# Patient Record
Sex: MALE | Race: WHITE | NOT HISPANIC OR LATINO | Employment: OTHER | URBAN - METROPOLITAN AREA
[De-identification: names, ages, dates, MRNs, and addresses within clinical notes are randomized per-mention and may not be internally consistent; named-entity substitution may affect disease eponyms.]

---

## 2021-08-06 ENCOUNTER — HOSPITAL ENCOUNTER (INPATIENT)
Facility: CLINIC | Age: 56
LOS: 3 days | Discharge: HOME | DRG: 389 | End: 2021-08-10
Attending: EMERGENCY MEDICINE | Admitting: INTERNAL MEDICINE
Payer: MEDICARE

## 2021-08-06 ENCOUNTER — APPOINTMENT (OUTPATIENT)
Dept: CT IMAGING | Facility: CLINIC | Age: 56
DRG: 389 | End: 2021-08-06
Attending: EMERGENCY MEDICINE
Payer: MEDICARE

## 2021-08-06 DIAGNOSIS — K56.600 PARTIAL SMALL BOWEL OBSTRUCTION (H): ICD-10-CM

## 2021-08-06 DIAGNOSIS — M05.79 RHEUMATOID ARTHRITIS INVOLVING MULTIPLE SITES WITH POSITIVE RHEUMATOID FACTOR (H): Primary | ICD-10-CM

## 2021-08-06 DIAGNOSIS — A04.72 C. DIFFICILE COLITIS: ICD-10-CM

## 2021-08-06 PROBLEM — E11.9 DM2 (DIABETES MELLITUS, TYPE 2) (H): Status: ACTIVE | Noted: 2020-09-04

## 2021-08-06 LAB
ALBUMIN SERPL-MCNC: 4.3 G/DL (ref 3.5–5)
ALBUMIN SERPL-MCNC: 4.3 G/DL (ref 3.5–5)
ALBUMIN UR-MCNC: 10 MG/DL
ALP SERPL-CCNC: 60 U/L (ref 45–120)
ALP SERPL-CCNC: 60 U/L (ref 45–120)
ALT SERPL W P-5'-P-CCNC: 32 U/L (ref 0–45)
ALT SERPL W P-5'-P-CCNC: 32 U/L (ref 0–45)
ANION GAP SERPL CALCULATED.3IONS-SCNC: 10 MMOL/L (ref 5–18)
APPEARANCE UR: CLEAR
AST SERPL W P-5'-P-CCNC: 29 U/L (ref 0–40)
AST SERPL W P-5'-P-CCNC: 29 U/L (ref 0–40)
BASOPHILS # BLD AUTO: 0 10E3/UL (ref 0–0.2)
BASOPHILS NFR BLD AUTO: 0 %
BILIRUB DIRECT SERPL-MCNC: 0.3 MG/DL
BILIRUB SERPL-MCNC: 1 MG/DL (ref 0–1)
BILIRUB SERPL-MCNC: 1 MG/DL (ref 0–1)
BILIRUB UR QL STRIP: NEGATIVE
BUN SERPL-MCNC: 11 MG/DL (ref 8–22)
CALCIUM SERPL-MCNC: 8.8 MG/DL (ref 8.5–10.5)
CHLORIDE BLD-SCNC: 106 MMOL/L (ref 98–107)
CO2 SERPL-SCNC: 21 MMOL/L (ref 22–31)
COLOR UR AUTO: YELLOW
CREAT SERPL-MCNC: 0.8 MG/DL (ref 0.7–1.3)
EOSINOPHIL # BLD AUTO: 0.3 10E3/UL (ref 0–0.7)
EOSINOPHIL NFR BLD AUTO: 3 %
ERYTHROCYTE [DISTWIDTH] IN BLOOD BY AUTOMATED COUNT: 12.8 % (ref 10–15)
GFR SERPL CREATININE-BSD FRML MDRD: >90 ML/MIN/1.73M2
GLUCOSE BLD-MCNC: 125 MG/DL (ref 70–125)
GLUCOSE UR STRIP-MCNC: NEGATIVE MG/DL
HCT VFR BLD AUTO: 43.4 % (ref 40–53)
HGB BLD-MCNC: 14.7 G/DL (ref 13.3–17.7)
HGB UR QL STRIP: NEGATIVE
IMM GRANULOCYTES # BLD: 0 10E3/UL
IMM GRANULOCYTES NFR BLD: 0 %
KETONES UR STRIP-MCNC: ABNORMAL MG/DL
LEUKOCYTE ESTERASE UR QL STRIP: NEGATIVE
LIPASE SERPL-CCNC: 18 U/L (ref 0–52)
LYMPHOCYTES # BLD AUTO: 2.8 10E3/UL (ref 0.8–5.3)
LYMPHOCYTES NFR BLD AUTO: 29 %
MCH RBC QN AUTO: 32.2 PG (ref 26.5–33)
MCHC RBC AUTO-ENTMCNC: 33.9 G/DL (ref 31.5–36.5)
MCV RBC AUTO: 95 FL (ref 78–100)
MONOCYTES # BLD AUTO: 0.8 10E3/UL (ref 0–1.3)
MONOCYTES NFR BLD AUTO: 9 %
MUCOUS THREADS #/AREA URNS LPF: PRESENT /LPF
NEUTROPHILS # BLD AUTO: 5.8 10E3/UL (ref 1.6–8.3)
NEUTROPHILS NFR BLD AUTO: 59 %
NITRATE UR QL: NEGATIVE
NRBC # BLD AUTO: 0 10E3/UL
NRBC BLD AUTO-RTO: 0 /100
PH UR STRIP: 5.5 [PH] (ref 5–7)
PLATELET # BLD AUTO: 179 10E3/UL (ref 150–450)
POTASSIUM BLD-SCNC: 4.1 MMOL/L (ref 3.5–5)
PROT SERPL-MCNC: 7.5 G/DL (ref 6–8)
PROT SERPL-MCNC: 7.5 G/DL (ref 6–8)
RBC # BLD AUTO: 4.56 10E6/UL (ref 4.4–5.9)
RBC URINE: 1 /HPF
SARS-COV-2 RNA RESP QL NAA+PROBE: NEGATIVE
SODIUM SERPL-SCNC: 137 MMOL/L (ref 136–145)
SP GR UR STRIP: 1.03 (ref 1–1.03)
SQUAMOUS EPITHELIAL: <1 /HPF
UROBILINOGEN UR STRIP-MCNC: 2 MG/DL
WBC # BLD AUTO: 9.7 10E3/UL (ref 4–11)
WBC URINE: 1 /HPF

## 2021-08-06 PROCEDURE — 81001 URINALYSIS AUTO W/SCOPE: CPT | Performed by: PHYSICIAN ASSISTANT

## 2021-08-06 PROCEDURE — 83690 ASSAY OF LIPASE: CPT | Performed by: PHYSICIAN ASSISTANT

## 2021-08-06 PROCEDURE — 36415 COLL VENOUS BLD VENIPUNCTURE: CPT | Performed by: EMERGENCY MEDICINE

## 2021-08-06 PROCEDURE — C9803 HOPD COVID-19 SPEC COLLECT: HCPCS

## 2021-08-06 PROCEDURE — 85025 COMPLETE CBC W/AUTO DIFF WBC: CPT | Performed by: EMERGENCY MEDICINE

## 2021-08-06 PROCEDURE — 82040 ASSAY OF SERUM ALBUMIN: CPT | Performed by: PHYSICIAN ASSISTANT

## 2021-08-06 PROCEDURE — 99285 EMERGENCY DEPT VISIT HI MDM: CPT | Mod: 25

## 2021-08-06 PROCEDURE — 83735 ASSAY OF MAGNESIUM: CPT | Performed by: INTERNAL MEDICINE

## 2021-08-06 PROCEDURE — 87635 SARS-COV-2 COVID-19 AMP PRB: CPT | Performed by: EMERGENCY MEDICINE

## 2021-08-06 PROCEDURE — 74176 CT ABD & PELVIS W/O CONTRAST: CPT

## 2021-08-06 PROCEDURE — 99220 PR INITIAL OBSERVATION CARE,LEVEL III: CPT | Performed by: INTERNAL MEDICINE

## 2021-08-06 PROCEDURE — G0378 HOSPITAL OBSERVATION PER HR: HCPCS

## 2021-08-06 RX ORDER — ACETAMINOPHEN 650 MG/1
650 SUPPOSITORY RECTAL EVERY 6 HOURS PRN
Status: DISCONTINUED | OUTPATIENT
Start: 2021-08-06 | End: 2021-08-10 | Stop reason: HOSPADM

## 2021-08-06 RX ORDER — ACETAMINOPHEN 325 MG/1
650 TABLET ORAL EVERY 6 HOURS PRN
Status: DISCONTINUED | OUTPATIENT
Start: 2021-08-06 | End: 2021-08-10 | Stop reason: HOSPADM

## 2021-08-06 RX ORDER — SODIUM CHLORIDE 9 MG/ML
INJECTION, SOLUTION INTRAVENOUS CONTINUOUS
Status: DISCONTINUED | OUTPATIENT
Start: 2021-08-06 | End: 2021-08-09

## 2021-08-06 RX ORDER — NALOXONE HYDROCHLORIDE 4 MG/.1ML
0.1 SPRAY NASAL PRN
COMMUNITY

## 2021-08-06 RX ORDER — BETAMETHASONE DIPROPIONATE 0.5 MG/ML
LOTION, AUGMENTED TOPICAL 2 TIMES DAILY PRN
COMMUNITY
Start: 2020-12-10

## 2021-08-06 RX ORDER — FOLIC ACID 1 MG/1
1 TABLET ORAL
COMMUNITY
Start: 2021-03-16

## 2021-08-06 RX ORDER — ROSUVASTATIN CALCIUM 40 MG/1
40 TABLET, COATED ORAL AT BEDTIME
COMMUNITY
Start: 2021-08-05

## 2021-08-06 RX ORDER — LIDOCAINE 4 G/G
1 PATCH TOPICAL DAILY PRN
COMMUNITY

## 2021-08-06 RX ORDER — POLYETHYLENE GLYCOL 3350 17 G/17G
17 POWDER, FOR SOLUTION ORAL PRN
COMMUNITY

## 2021-08-06 RX ORDER — ONDANSETRON 4 MG/1
4 TABLET, ORALLY DISINTEGRATING ORAL EVERY 6 HOURS PRN
Status: DISCONTINUED | OUTPATIENT
Start: 2021-08-06 | End: 2021-08-10 | Stop reason: HOSPADM

## 2021-08-06 RX ORDER — TRIAMCINOLONE ACETONIDE 1 MG/ML
1 LOTION TOPICAL 2 TIMES DAILY PRN
COMMUNITY
Start: 2020-12-10

## 2021-08-06 RX ORDER — FENOFIBRATE 160 MG/1
160 TABLET ORAL DAILY
COMMUNITY
Start: 2021-01-19

## 2021-08-06 RX ORDER — ONDANSETRON 2 MG/ML
4 INJECTION INTRAMUSCULAR; INTRAVENOUS EVERY 6 HOURS PRN
Status: DISCONTINUED | OUTPATIENT
Start: 2021-08-06 | End: 2021-08-10 | Stop reason: HOSPADM

## 2021-08-06 RX ORDER — CYCLOBENZAPRINE HCL 10 MG
10 TABLET ORAL
COMMUNITY
Start: 2020-12-10

## 2021-08-06 RX ORDER — GLIPIZIDE 5 MG/1
1 TABLET, FILM COATED, EXTENDED RELEASE ORAL EVERY 24 HOURS
COMMUNITY
Start: 2021-06-22

## 2021-08-06 ASSESSMENT — ACTIVITIES OF DAILY LIVING (ADL)
DRESSING/BATHING_DIFFICULTY: NO
WALKING_OR_CLIMBING_STAIRS_DIFFICULTY: NO
WEAR_GLASSES_OR_BLIND: YES
DIFFICULTY_EATING/SWALLOWING: NO
CONCENTRATING,_REMEMBERING_OR_MAKING_DECISIONS_DIFFICULTY: NO
DEPENDENT_IADLS:: INDEPENDENT
FALL_HISTORY_WITHIN_LAST_SIX_MONTHS: NO
TOILETING_ISSUES: NO
DOING_ERRANDS_INDEPENDENTLY_DIFFICULTY: NO
VISION_MANAGEMENT: GLASSES
DIFFICULTY_COMMUNICATING: NO

## 2021-08-06 ASSESSMENT — MIFFLIN-ST. JEOR: SCORE: 2026.48

## 2021-08-06 NOTE — ED TRIAGE NOTES
Pt presents to the ED with c/o worsening abdominal pain and right flank pain for the past 3 days. Denies any fevers or emesis.

## 2021-08-07 LAB
ALBUMIN SERPL-MCNC: 3.8 G/DL (ref 3.5–5)
ALP SERPL-CCNC: 59 U/L (ref 45–120)
ALT SERPL W P-5'-P-CCNC: 25 U/L (ref 0–45)
ANION GAP SERPL CALCULATED.3IONS-SCNC: 7 MMOL/L (ref 5–18)
AST SERPL W P-5'-P-CCNC: 23 U/L (ref 0–40)
BASOPHILS # BLD AUTO: 0 10E3/UL (ref 0–0.2)
BASOPHILS NFR BLD AUTO: 0 %
BILIRUB SERPL-MCNC: 1.2 MG/DL (ref 0–1)
BUN SERPL-MCNC: 10 MG/DL (ref 8–22)
CALCIUM SERPL-MCNC: 8.5 MG/DL (ref 8.5–10.5)
CHLORIDE BLD-SCNC: 106 MMOL/L (ref 98–107)
CO2 SERPL-SCNC: 25 MMOL/L (ref 22–31)
CREAT SERPL-MCNC: 0.75 MG/DL (ref 0.7–1.3)
EOSINOPHIL # BLD AUTO: 0.3 10E3/UL (ref 0–0.7)
EOSINOPHIL NFR BLD AUTO: 4 %
ERYTHROCYTE [DISTWIDTH] IN BLOOD BY AUTOMATED COUNT: 12.8 % (ref 10–15)
GFR SERPL CREATININE-BSD FRML MDRD: >90 ML/MIN/1.73M2
GLUCOSE BLD-MCNC: 120 MG/DL (ref 70–125)
GLUCOSE BLDC GLUCOMTR-MCNC: 104 MG/DL (ref 70–125)
GLUCOSE BLDC GLUCOMTR-MCNC: 115 MG/DL (ref 70–125)
GLUCOSE BLDC GLUCOMTR-MCNC: 117 MG/DL (ref 70–125)
GLUCOSE BLDC GLUCOMTR-MCNC: 93 MG/DL (ref 70–125)
HCT VFR BLD AUTO: 40.2 % (ref 40–53)
HGB BLD-MCNC: 13.8 G/DL (ref 13.3–17.7)
IMM GRANULOCYTES # BLD: 0 10E3/UL
IMM GRANULOCYTES NFR BLD: 0 %
LYMPHOCYTES # BLD AUTO: 2.3 10E3/UL (ref 0.8–5.3)
LYMPHOCYTES NFR BLD AUTO: 29 %
MAGNESIUM SERPL-MCNC: 1.8 MG/DL (ref 1.8–2.6)
MCH RBC QN AUTO: 33 PG (ref 26.5–33)
MCHC RBC AUTO-ENTMCNC: 34.3 G/DL (ref 31.5–36.5)
MCV RBC AUTO: 96 FL (ref 78–100)
MONOCYTES # BLD AUTO: 0.7 10E3/UL (ref 0–1.3)
MONOCYTES NFR BLD AUTO: 8 %
NEUTROPHILS # BLD AUTO: 4.8 10E3/UL (ref 1.6–8.3)
NEUTROPHILS NFR BLD AUTO: 59 %
NRBC # BLD AUTO: 0 10E3/UL
NRBC BLD AUTO-RTO: 0 /100
PLATELET # BLD AUTO: 193 10E3/UL (ref 150–450)
POTASSIUM BLD-SCNC: 3.8 MMOL/L (ref 3.5–5)
PROT SERPL-MCNC: 6.7 G/DL (ref 6–8)
RBC # BLD AUTO: 4.18 10E6/UL (ref 4.4–5.9)
SODIUM SERPL-SCNC: 138 MMOL/L (ref 136–145)
WBC # BLD AUTO: 8.2 10E3/UL (ref 4–11)

## 2021-08-07 PROCEDURE — 85025 COMPLETE CBC W/AUTO DIFF WBC: CPT | Performed by: INTERNAL MEDICINE

## 2021-08-07 PROCEDURE — 99207 PR CDG-MDM COMPONENT: MEETS MODERATE - DOWN CODED: CPT | Performed by: INTERNAL MEDICINE

## 2021-08-07 PROCEDURE — G0378 HOSPITAL OBSERVATION PER HR: HCPCS

## 2021-08-07 PROCEDURE — 99232 SBSQ HOSP IP/OBS MODERATE 35: CPT | Performed by: INTERNAL MEDICINE

## 2021-08-07 PROCEDURE — 36415 COLL VENOUS BLD VENIPUNCTURE: CPT | Performed by: INTERNAL MEDICINE

## 2021-08-07 PROCEDURE — 82040 ASSAY OF SERUM ALBUMIN: CPT | Performed by: INTERNAL MEDICINE

## 2021-08-07 PROCEDURE — 120N000001 HC R&B MED SURG/OB

## 2021-08-07 PROCEDURE — 250N000013 HC RX MED GY IP 250 OP 250 PS 637: Performed by: INTERNAL MEDICINE

## 2021-08-07 PROCEDURE — 258N000003 HC RX IP 258 OP 636: Performed by: INTERNAL MEDICINE

## 2021-08-07 PROCEDURE — 99222 1ST HOSP IP/OBS MODERATE 55: CPT | Performed by: PHYSICIAN ASSISTANT

## 2021-08-07 RX ORDER — ASPIRIN 81 MG/1
81 TABLET, CHEWABLE ORAL DAILY
Status: DISCONTINUED | OUTPATIENT
Start: 2021-08-07 | End: 2021-08-10 | Stop reason: HOSPADM

## 2021-08-07 RX ORDER — HYDROCODONE BITARTRATE AND ACETAMINOPHEN 5; 325 MG/1; MG/1
1-2 TABLET ORAL EVERY 4 HOURS PRN
Status: DISCONTINUED | OUTPATIENT
Start: 2021-08-07 | End: 2021-08-10 | Stop reason: HOSPADM

## 2021-08-07 RX ORDER — ATORVASTATIN CALCIUM 40 MG/1
80 TABLET, FILM COATED ORAL AT BEDTIME
Status: DISCONTINUED | OUTPATIENT
Start: 2021-08-07 | End: 2021-08-10 | Stop reason: HOSPADM

## 2021-08-07 RX ORDER — NITROGLYCERIN 0.4 MG/1
0.4 TABLET SUBLINGUAL EVERY 5 MIN PRN
Status: DISCONTINUED | OUTPATIENT
Start: 2021-08-07 | End: 2021-08-10 | Stop reason: HOSPADM

## 2021-08-07 RX ORDER — NICOTINE POLACRILEX 4 MG
15-30 LOZENGE BUCCAL
Status: DISCONTINUED | OUTPATIENT
Start: 2021-08-07 | End: 2021-08-10 | Stop reason: HOSPADM

## 2021-08-07 RX ORDER — CYCLOBENZAPRINE HCL 10 MG
10 TABLET ORAL
Status: DISCONTINUED | OUTPATIENT
Start: 2021-08-07 | End: 2021-08-10 | Stop reason: HOSPADM

## 2021-08-07 RX ORDER — CLOPIDOGREL BISULFATE 75 MG/1
75 TABLET ORAL DAILY
Status: DISCONTINUED | OUTPATIENT
Start: 2021-08-07 | End: 2021-08-10 | Stop reason: HOSPADM

## 2021-08-07 RX ORDER — FOLIC ACID 1 MG/1
1 TABLET ORAL
Status: DISCONTINUED | OUTPATIENT
Start: 2021-08-07 | End: 2021-08-10 | Stop reason: HOSPADM

## 2021-08-07 RX ORDER — MORPHINE SULFATE 30 MG/1
60 TABLET, FILM COATED, EXTENDED RELEASE ORAL EVERY 12 HOURS SCHEDULED
Status: DISCONTINUED | OUTPATIENT
Start: 2021-08-07 | End: 2021-08-10 | Stop reason: HOSPADM

## 2021-08-07 RX ORDER — FENOFIBRATE 160 MG/1
160 TABLET ORAL DAILY
Status: DISCONTINUED | OUTPATIENT
Start: 2021-08-07 | End: 2021-08-10 | Stop reason: HOSPADM

## 2021-08-07 RX ORDER — SIMETHICONE 80 MG
80 TABLET,CHEWABLE ORAL EVERY 6 HOURS PRN
Status: DISCONTINUED | OUTPATIENT
Start: 2021-08-07 | End: 2021-08-10 | Stop reason: HOSPADM

## 2021-08-07 RX ORDER — DEXTROSE MONOHYDRATE 25 G/50ML
25-50 INJECTION, SOLUTION INTRAVENOUS
Status: DISCONTINUED | OUTPATIENT
Start: 2021-08-07 | End: 2021-08-10 | Stop reason: HOSPADM

## 2021-08-07 RX ORDER — PANTOPRAZOLE SODIUM 20 MG/1
40 TABLET, DELAYED RELEASE ORAL DAILY
Status: DISCONTINUED | OUTPATIENT
Start: 2021-08-07 | End: 2021-08-10 | Stop reason: HOSPADM

## 2021-08-07 RX ORDER — ROSUVASTATIN CALCIUM 10 MG/1
40 TABLET, COATED ORAL AT BEDTIME
Status: DISCONTINUED | OUTPATIENT
Start: 2021-08-07 | End: 2021-08-10 | Stop reason: HOSPADM

## 2021-08-07 RX ADMIN — SODIUM CHLORIDE: 9 INJECTION, SOLUTION INTRAVENOUS at 18:04

## 2021-08-07 RX ADMIN — SIMETHICONE 80 MG: 80 TABLET, CHEWABLE ORAL at 14:51

## 2021-08-07 RX ADMIN — ATORVASTATIN CALCIUM 80 MG: 40 TABLET, FILM COATED ORAL at 00:43

## 2021-08-07 RX ADMIN — ROSUVASTATIN CALCIUM 40 MG: 10 TABLET, FILM COATED ORAL at 00:43

## 2021-08-07 RX ADMIN — MORPHINE SULFATE 60 MG: 30 TABLET, FILM COATED, EXTENDED RELEASE ORAL at 00:43

## 2021-08-07 RX ADMIN — HYDROCODONE BITARTRATE AND ACETAMINOPHEN 2 TABLET: 5; 325 TABLET ORAL at 16:11

## 2021-08-07 RX ADMIN — HYDROCODONE BITARTRATE AND ACETAMINOPHEN 1 TABLET: 5; 325 TABLET ORAL at 04:11

## 2021-08-07 RX ADMIN — ATORVASTATIN CALCIUM 80 MG: 40 TABLET, FILM COATED ORAL at 20:56

## 2021-08-07 RX ADMIN — HYDROCODONE BITARTRATE AND ACETAMINOPHEN 2 TABLET: 5; 325 TABLET ORAL at 20:56

## 2021-08-07 RX ADMIN — FENOFIBRATE 160 MG: 160 TABLET ORAL at 08:07

## 2021-08-07 RX ADMIN — PANTOPRAZOLE SODIUM 40 MG: 20 TABLET, DELAYED RELEASE ORAL at 08:07

## 2021-08-07 RX ADMIN — Medication 12.5 MG: at 00:43

## 2021-08-07 RX ADMIN — ASPIRIN 81 MG CHEWABLE TABLET 81 MG: 81 TABLET CHEWABLE at 08:07

## 2021-08-07 RX ADMIN — HYDROCODONE BITARTRATE AND ACETAMINOPHEN 1 TABLET: 5; 325 TABLET ORAL at 08:07

## 2021-08-07 RX ADMIN — MORPHINE SULFATE 60 MG: 30 TABLET, FILM COATED, EXTENDED RELEASE ORAL at 12:31

## 2021-08-07 RX ADMIN — SODIUM CHLORIDE: 9 INJECTION, SOLUTION INTRAVENOUS at 09:49

## 2021-08-07 RX ADMIN — CLOPIDOGREL BISULFATE 75 MG: 75 TABLET, FILM COATED ORAL at 08:07

## 2021-08-07 RX ADMIN — Medication 12.5 MG: at 20:57

## 2021-08-07 RX ADMIN — Medication 12.5 MG: at 08:07

## 2021-08-07 RX ADMIN — ROSUVASTATIN CALCIUM 40 MG: 10 TABLET, FILM COATED ORAL at 20:56

## 2021-08-07 RX ADMIN — SODIUM CHLORIDE: 9 INJECTION, SOLUTION INTRAVENOUS at 01:37

## 2021-08-07 NOTE — CONSULTS
Care Management Initial Consult    General Information  Assessment completed with: Patient,    Type of CM/SW Visit: Initial Assessment    Primary Care Provider verified and updated as needed:     Readmission within the last 30 days:        Reason for Consult: discharge planning  Advance Care Planning:            Communication Assessment  Patient's communication style: spoken language (English or Bilingual)    Hearing Difficulty or Deaf: no   Wear Glasses or Blind: no    Cognitive  Cognitive/Neuro/Behavioral: WDL                      Living Environment:   People in home: child(angi), adult, grandchild(angi)     Current living Arrangements: house      Able to return to prior arrangements: yes       Family/Social Support:  Care provided by: self  Provides care for: no one  Marital Status:   Children          Description of Support System: Supportive         Current Resources:   Patient receiving home care services: No     Community Resources: None  Equipment currently used at home: none  Supplies currently used at home: None    Employment/Financial:  Employment Status: retired        Financial Concerns:             Lifestyle & Psychosocial Needs:  Social Determinants of Health     Tobacco Use: Unknown     Smoking Tobacco Use: Never Smoker     Smokeless Tobacco Use: Unknown   Alcohol Use:      Frequency of Alcohol Consumption:      Average Number of Drinks:      Frequency of Binge Drinking:    Financial Resource Strain:      Difficulty of Paying Living Expenses:    Food Insecurity:      Worried About Running Out of Food in the Last Year:      Ran Out of Food in the Last Year:    Transportation Needs:      Lack of Transportation (Medical):      Lack of Transportation (Non-Medical):    Physical Activity:      Days of Exercise per Week:      Minutes of Exercise per Session:    Stress:      Feeling of Stress :    Social Connections:      Frequency of Communication with Friends and Family:      Frequency of Social  Gatherings with Friends and Family:      Attends Muslim Services:      Active Member of Clubs or Organizations:      Attends Club or Organization Meetings:      Marital Status:    Intimate Partner Violence:      Fear of Current or Ex-Partner:      Emotionally Abused:      Physically Abused:      Sexually Abused:    Depression:      PHQ-2 Score:    Housing Stability:      Unable to Pay for Housing in the Last Year:      Number of Places Lived in the Last Year:      Unstable Housing in the Last Year:        Functional Status:  Prior to admission patient needed assistance:   Dependent ADLs:: Independent  Dependent IADLs:: Independent  Assesssment of Functional Status: At functional baseline    Mental Health Status:          Chemical Dependency Status:                Values/Beliefs:  Spiritual, Cultural Beliefs, Muslim Practices, Values that affect care:                 Additional Information:  AIDET completed. Pt lives in a private residence, his dtr and grandchildren live with him. He is independent with all ADL's, has no services and no DME used. Anticipate pt will DC back home without needs. Family will transport upon DC. Informed that CM will follow progression of care.   LUCRECIA Le      Abbreviation Code:  [facility]), Home care (HC), Patient (Pt), Transitional Care Unit (TCU), Skilled Nursing Facility (SNF), Assisted Living (AL), Independent Living (IL), Physical Therapy (PT), Occupational Therapy (OT)        Marialuisa Sandhu RN

## 2021-08-07 NOTE — PLAN OF CARE
Problem: Adult Inpatient Plan of Care  Goal: Patient-Specific Goal (Individualized)  Outcome: Improving     Problem: Nausea and Vomiting (Intestinal Obstruction)  Goal: Nausea and Vomiting Relief  Outcome: Improving     Problem: Pain (Intestinal Obstruction)  Goal: Acceptable Pain Control  Outcome: Improving   A/O, VSS, LS clear. Pt is voiding. Need BM, pt is on precautions for c-diff. IVF @ 125 ml/hr. Chronic pain throughout his body. IND with ambulation.  Given PRN MS contin for pain. AB is distended and rounded. NPO, can have ice chips.

## 2021-08-07 NOTE — PROGRESS NOTES
3:30 PM    AUGUSTINA met with Pt to complete MOON form.  Pt declined to sign form and  expresses that he did not know he was under observation and he would have left if he knew that.  Pt requested to see the MD to discuss this.  [facility] paged MD and requested that Pt be seen.    ENRIKE Amato

## 2021-08-07 NOTE — PLAN OF CARE
Problem: Adult Inpatient Plan of Care  Goal: Patient-Specific Goal (Individualized)  Outcome: Improving     Problem: Nausea and Vomiting (Intestinal Obstruction)  Goal: Nausea and Vomiting Relief  Outcome: Improving     Problem: Pain (Intestinal Obstruction)  Goal: Acceptable Pain Control  Outcome: Improving     A/O, VSS, LS clear. Pt is voiding and ambulating. NPO. Will have general surgery and GI consult today. Chronic pain, given PRN Mathews for abdomen 0411. Does help relieve pain. Pt denied passing gas. Abdomen is distended and rounded. Last BM was yesterday 1600. Stool sample still needed. Is on enteric precautions.

## 2021-08-07 NOTE — PLAN OF CARE
"Patient receiving MS Contin and Norco for abdominal pain. No BM during the day but having abdominal fullness and distention. PRN Simethicone administered. GI and surgery consult completed. Continues to be NPO with ice chips and meds. VSS.     PRIMARY DIAGNOSIS: \"GENERIC\" NURSING  OUTPATIENT/OBSERVATION GOALS TO BE MET BEFORE DISCHARGE:  ADLs back to baseline: Yes    Activity and level of assistance: Ambulating independently.    Pain status: No improvement noted. Consider adjustment in pain regimen.    Return to near baseline physical activity: Yes     Discharge Planner Nurse   Safe discharge environment identified: Yes  Barriers to discharge: Yes       Entered by: Consuelo Isabel 08/07/2021 3:01 PM     Please review provider order for any additional goals.   Nurse to notify provider when observation goals have been met and patient is ready for discharge.  "

## 2021-08-07 NOTE — CONSULTS
GASTROENTEROLOGY CONSULTATION      Shannan Kothari  [address] [place] [address]  55 year old male     Admission Date/Time: 8/6/2021  Primary Care Provider: Kristy Agustin     We were asked to see the patient in consultation by Dr. Sierra for evaluation of abdominal pain.     CC: acute on chronic abdominal pain      HPI:  Shannan Kothari is a 55 year old male with past medical history significant for chronic back pain on chronic opioid therapy, type 2 DM, CAD s/p cardiac stent on Plavix, psoriatic arthritis on methotrexate, C diff in 9/2020 admitted with acute on chronic abdominal pain, diarrhea, nausea with CT concerning for partial small bowel obstruction vs ileus.     Patient has a history dating back to around 2015 of postprandial crampy upper abdominal pain, diaphoresis, bloating, diarrhea. Symptoms have been intermittent. He has undergone evaluation through our office including EGD, colonoscopy and multiple CT scans through ER visits that have been unremarkable. Most recent colonoscopy in 2018- normal. Most recent EGD 2016. He eventually had cholecystectomy due to the pain without relief. A 5HIAA urine was negative in 2018. He has been tried on dicyclomine, digestive enzymes, antacids, and metronidazole. He presented to [facility] in September 2020 at which time C diff PCR and GDH EIA were positive but toxin A/B were negative. Due to the severity of his diarrhea contact with wife who had C diff, he was treated with vancomycin. He reports actually doing a taper of vancomycin over 3 months. His pain and diarrhea seemed to get better.     Over the past couple months, ~ 1 a month he will get recurrent symptoms and he will go on clear liquid diet for 2-3 days and symptoms will improve. It usually involves nausea and severe liquid diarrhea.     A couple days ago, he noticed more pain, slightly different than typical localized more to the right abdomen radiating to his RLQ. He had associated watery  diarrhea and nausea. He takes Zofran at home for this and states it helped so he did not vomit. He actually thought he might have either recurrent C diff or a kidney stone prompting him to present to the hospital. No fevers, chills, melena, weight loss. No recent antibiotics.     Of note, patient take MS Contin 60mg twice daily and Norco 325 mg approximately 3-4 times daily due to chronic back pain. He is followed at a pain clinic. He has been requiring more narcotics over the past couple years to manage his pain. He is on Plavix and baby ASA. No NSAIDs. He reports HGB A1c recently was in the 5 range. Only intra-abdominal surgery is cholecystectomy.     Workup here showed normal CBC, CMP but CT abd/pelvis without IV contrast showed possible partial small bowel obstruction with a few distended small bowel loops and air fluid levels with distal small bowel decompression vs paralytic ileus. Stool studies ordered but he has not had a BM since 4pm yesterday.       PAST MEDICAL HISTORY:  Patient Active Problem List    Diagnosis Date Noted     Partial small bowel obstruction (H) 08/06/2021     Priority: Medium     DM2 (diabetes mellitus, type 2) (H) 09/04/2020     Priority: Medium     Rheumatoid arthritis (H) 01/15/2010     Priority: Medium     Coronary atherosclerosis due to lipid rich plaque 03/28/2008     Priority: Medium          ROS: A comprehensive ten point review of systems was negative aside from those in mentioned in the HPI.       MEDICATIONS:   Prior to Admission medications    Medication Sig Start Date End Date Taking? Authorizing Provider   aspirin 81 mg chewable tablet [ASPIRIN 81 MG CHEWABLE TABLET] Chew 81 mg daily. 12/2/16  Yes Provider, Historical   atorvastatin (LIPITOR) 80 MG tablet [ATORVASTATIN (LIPITOR) 80 MG TABLET] Take 80 mg by mouth bedtime. 12/2/16  Yes Provider, Historical   clopidogrel (PLAVIX) 75 mg tablet [CLOPIDOGREL (PLAVIX) 75 MG TABLET] Take 75 mg by mouth daily. 12/2/16  Yes Provider,  Historical   cyclobenzaprine (FLEXERIL) 10 MG tablet Take 10 mg by mouth at bedtime as needed, may repeat once for muscle spasms 12/10/20  Yes Unknown, Entered By History   fenofibrate (TRIGLIDE/LOFIBRA) 160 MG tablet Take 160 mg by mouth daily  1/19/21  Yes Unknown, Entered By History   folic acid (FOLVITE) 1 MG tablet Take 1 mg by mouth daily before breakfast 3/16/21  Yes Unknown, Entered By History   glipiZIDE (GLUCOTROL XL) 5 MG 24 hr tablet Take 1 tablet by mouth every 24 hours 6/22/21  Yes Unknown, Entered By History   HYDROcodone-acetaminophen 5-325 mg per tablet [HYDROCODONE-ACETAMINOPHEN 5-325 MG PER TABLET] Take 1-2 tablets by mouth every 4 (four) hours as needed for pain.  12/2/16  Yes Provider, Historical   Lidocaine (LIDOCARE) 4 % Patch Place 1 patch onto the skin daily as needed   Yes Unknown, Entered By History   methotrexate 2.5 MG tablet [METHOTREXATE 2.5 MG TABLET] Take 15 mg by mouth once a week. 6 tablets every Friday. 12/7/16  Yes Provider, Historical   metoprolol tartrate (LOPRESSOR) 25 MG tablet [METOPROLOL TARTRATE (LOPRESSOR) 25 MG TABLET] Take 12.5 mg by mouth 2 (two) times a day. 12/7/16  Yes Provider, Historical   morphine (MS CONTIN) 60 MG 12 hr tablet [MORPHINE (MS CONTIN) 60 MG 12 HR TABLET] Take 60 mg by mouth 2 (two) times a day. 12/2/16  Yes Provider, Historical   naloxone (NARCAN) 4 MG/0.1ML nasal spray Spray 0.1 mLs in nostril as needed spray 0.1 milliliter by intranasal route in 1 nostril may repeat dose every 2-3 minutes as needed alternating nostrils with each dose for emergency use   Yes Unknown, Entered By History   nitroglycerin (NITROSTAT) 0.4 MG SL tablet [NITROGLYCERIN (NITROSTAT) 0.4 MG SL TABLET] Place 0.4 mg under the tongue every 5 (five) minutes as needed for chest pain. 12/7/16  Yes Provider, Historical   OMEGA-3/DHA/EPA/FISH OIL (FISH OIL-OMEGA-3 FATTY ACIDS) 300-1,000 mg capsule [OMEGA-3/DHA/EPA/FISH OIL (FISH OIL-OMEGA-3 FATTY ACIDS) 300-1,000 MG CAPSULE] Take 1  "g by mouth 2 (two) times a day.  12/2/16  Yes Provider, Historical   pantoprazole (PROTONIX) 40 MG tablet Take 40 mg by mouth daily  12/7/16  Yes Provider, Historical   polyethylene glycol (MIRALAX) 17 GM/Dose powder Take 17 g by mouth as needed   Yes Unknown, Entered By History   rosuvastatin (CRESTOR) 40 MG tablet Take 40 mg by mouth At Bedtime 8/5/21  Yes Unknown, Entered By History   triamcinolone (KENALOG) 0.1 % external lotion Apply 1 Application topically 2 times daily as needed Apply  topically to affected area(s) 2 times daily prn For face 12/10/20  Yes Unknown, Entered By History   augmented betamethasone dipropionate (DIPROLENE) 0.05 % external lotion Apply topically 2 times daily as needed Apply to scalp as need 12/10/20   Unknown, Entered By History   sertraline (ZOLOFT) 50 MG tablet [SERTRALINE (ZOLOFT) 50 MG TABLET] Take 50 mg by mouth daily.  Patient not taking: Reported on 8/6/2021 12/7/16   Provider, Historical        ALLERGIES: No Known Allergies     SOCIAL HISTORY:  Social History     Tobacco Use     Smoking status: Never Smoker     Smokeless tobacco: Never Used   Substance Use Topics     Alcohol use: No     Drug use: No        FAMILY HISTORY:  No family history on file.     PHYSICAL EXAM:   /67 (BP Location: Right arm)   Pulse 63   Temp 97.5  F (36.4  C) (Oral)   Resp 18   Ht 1.854 m (6' 1\")   Wt 114.8 kg (253 lb)   SpO2 93%   BMI 33.38 kg/m       PHYSICAL EXAM:  General: alert, oriented, NAD  SKIN: no suspicious lesions, rashes, jaundice, or spider angiomas  HEAD: Normocephalic. No masses, lesions, tenderness or abnormalities  NECK: Neck supple. No adenopathy. Thyroid symmetric, normal size.  EYES: No scleral icterus  ENT: ENT exam normal, no neck nodes or sinus tenderness  RESPIRATORY: negative, Good diaphragmatic excursion. Lungs clear  CARDIOVASCULAR: negative, PMI normal. No lifts, heaves, or thrills. RRR. No murmurs, clicks gallops or rub  GASTROINTESTINAL: did not appreciate " bowel sounds, soft, moderate upper abdominal, LUQ, and right sided abdominal tenderness, no HSM, no masses/guarding/rebound  JOINT/EXTREMITIES: extremities normal- no gross deformities noted, gait normal and normal muscle tone  NEURO: Reflexes grossly normal and symmetric. Sensation grossly WNL.  PSYCH: no abnormal anxiety/depression  LYMPH: No anterior cervical, posterior cervical, or supraclavicular adenopathy     LABS:  I reviewed the patient's new clinical lab test results.   Recent Labs   Lab Test 08/07/21 0649 08/06/21 1917   WBC 8.2 9.7   HGB 13.8 14.7   MCV 96 95    179     Recent Labs   Lab Test 08/07/21  0649 08/06/21 1917    137   POTASSIUM 3.8 4.1   CHLORIDE 106 106   CO2 25 21*   BUN 10 11   ANIONGAP 7 10   MODESTO 8.5 8.8     Recent Labs   Lab Test 08/07/21 0649 08/06/21  2225 08/06/21 1917   ALBUMIN 3.8  --  4.3  4.3   BILITOTAL 1.2*  --  1.0  1.0   ALT 25  --  32  32   AST 23  --  29  29   ALKPHOS 59  --  60  60   PROTEIN  --  10 *  --    LIPASE  --   --  18        IMAGING  I personally reviewed the patient's new imaging results.    EXAM: CT ABDOMEN PELVIS W/O CONTRAST  LOCATION: [facility]  DATE/TIME: 8/6/2021 7:33 PM     INDICATION: Flank pain, kidney stone suspected  COMPARISON: None.  TECHNIQUE: CT scan of the abdomen and pelvis was performed without IV contrast. Multiplanar reformats were obtained. Dose reduction techniques were used.  CONTRAST: None.     FINDINGS:   LOWER CHEST: Coronary artery disease.      HEPATOBILIARY: Hepatic steatosis. Cholecystectomy.      PANCREAS: Normal.     SPLEEN: Normal.     ADRENAL GLANDS: Normal.     KIDNEYS/BLADDER: No renal or ureteral calculi. No hydronephrosis or hydroureter. Normal urinary bladder.      BOWEL: Normal stomach. A few loops of small bowel in the anterior abdomen are mildly distended with a maximum diameter of 3.1 m. Air-fluid levels are present. There is change in caliber in the upper pelvis just to  the right of midline (axial series 3   image 153 and coronal series 5 image 55).  More distally the small bowel is normal in caliber. Normal appendix. Normal appearance of the colon with air-fluid levels. No pneumatosis.     LYMPH NODES: Normal.     VASCULATURE: Atherosclerotic disease. No portal venous gas.     PELVIC ORGANS: Normal.     OTHER: There is a small amount of free fluid in the pelvis.     MUSCULOSKELETAL: Normal.                                                               IMPRESSION:   1.  No renal or ureteral calculi. No hydronephrosis or hydroureter.  2.  Possible partial or mild small bowel obstruction. Adhesive disease is suspected. No pneumatosis or portal venous gas. A small amount of free fluid in the pelvis may be related. Note that paralytic ileus could have a similar appearance.  3.  Hepatic steatosis.      CONSULTATION ASSESSMENT AND PLAN:    55 year old male with past medical history significant for chronic back pain on chronic opioid therapy, type 2 DM, CAD s/p cardiac stent on Plavix, psoriatic arthritis on methotrexate, C diff in 9/2020, prior cholecystectomy admitted with acute on chronic abdominal pain, diarrhea, nausea with CT concerning for partial small bowel obstruction vs ileus.     1. Abdominal pain. pSBO vs ileus. Certainly at risk for ileus with high dose chronic opioid therapy. With diarrhea and history of C diff, this is considerd but now diarrhea has resolved and no bowel inflammation on CT. His periodic episodes over the past few months of postprandial pain with improvement on clear liquids would indicate possible issues with recurrent pSBO. Has one intra-abd surgery - cholecystectomy. Electrolytes are normal. Has had extensive GI workup for abd pain since 2016 as noted in HPI. No signs of Crohn's.   --NPO.   --Minimize narcotics.   --Consider NG for decompression with ongoing pain despite no issues with vomiting.   --Await surgery recs.   --May need repeat imaging with  contrast.   --If diarrhea recurs, check enteric panel and C diff.     Discussed with Dr. Mars.     Thank you for asking us to participate in the care of this patient.    JERONIMO Chapin  [facility] ([facility])         ELKE VILLAVICENCIO Note    Patient interviewed, examined, chart reviewed. I have discussed his further management with JERONIMO Chapin; and I agree with her assessment and plan. Please see her note for details.    Briefly,  is a 55 yr old male with chronic back pain and chronic opioid therapy, type 2 DM, CAD with prior PCI on chronic plavix, psoriatic arthritis on methotrexate, prior hx of c diff in Sept 2020 and prior cholecystectomy.    Presented with acute on chronic abd pain, diarrhea and nausea; with a CT scan concerning for PSBO vs ileus.    Diarrhea has resolved, now with constipation and not passing flatus, also feels bloated.  Nausea persists, no emesis.  No GI bleeding.    Concern for underlying opioid bowel; exclude infection    Agree with recommendations per surgery team and Leilani Caro.    Rod Trent MD on 8/7/2021 at 5:38 PM

## 2021-08-07 NOTE — CONSULTS
General Surgery Consultation  Shannan Kothari MRN# [id number]   Age/Sex: 55 year old male YOB: 1965     Reason for consult: 1. Partial small bowel obstruction (H)            Requesting physician: Dr. Sierra                   Assessment and Plan:   Assessment:  Gastroenteritis, possible recurrent c diff  CT shows signs of SBO, but he has been stooling, so clinically low suspicion of this    Plan:  Reviewed exam, lab, and imaging findings with him. Since this seems more of an acute on chronic issue and has been having loose stools, doubt SBO at this time. Certainly no indication for surgery. Agree with GI consult. Waiting on sample for c. Diff. From our standpoint, would be ok to do trial of clear liquids and ADAT, but will await GI input.       Ivan Gooden PA-C  Pager - [phone]  Phone - [phone]   General Surgery           Chief Complaint:     Chief Complaint   Patient presents with     Abdominal Pain     Flank Pain        History is obtained from the patient    HPI:   Shannan Kothari is a 55 year old male who presents with worsening abdominal pain and loose stools. He states he has had this chronically for about 5 years now. Every 1-2 months he will get flares similar to this that he usually can manage at home. He did have c diff last September and this feels similar to that. He has been unable to give a sample to this point. This current episode has last about 5 days and has been loose, nonbloody stools during this. The pain is crampy in nature. He has had a previous lap niko, but otherwise, no other abdominal surgeries.           Past Medical History:   History reviewed. No pertinent past medical history.           Past Surgical History:     Past Surgical History:   Procedure Laterality Date     CORONARY STENT PLACEMENT       LAPAROSCOPIC CHOLECYSTECTOMY N/A 12/7/2016    Procedure: CHOLECYSTECTOMY LAPAROSCOPIC;  Surgeon: Jimmy Lockwood MD;  Location: [facility], [place];  Service:       REPAIR CLEFT PALATE ADULT               Social History:    reports that he has never smoked. He has never used smokeless tobacco. He reports that he does not drink alcohol and does not use drugs.           Family History:   No family history on file.           Allergies:   No Known Allergies           Medications:     Prior to Admission medications    Medication Sig Start Date End Date Taking? Authorizing Provider   aspirin 81 mg chewable tablet [ASPIRIN 81 MG CHEWABLE TABLET] Chew 81 mg daily. 12/2/16  Yes Provider, Historical   atorvastatin (LIPITOR) 80 MG tablet [ATORVASTATIN (LIPITOR) 80 MG TABLET] Take 80 mg by mouth bedtime. 12/2/16  Yes Provider, Historical   clopidogrel (PLAVIX) 75 mg tablet [CLOPIDOGREL (PLAVIX) 75 MG TABLET] Take 75 mg by mouth daily. 12/2/16  Yes Provider, Historical   cyclobenzaprine (FLEXERIL) 10 MG tablet Take 10 mg by mouth at bedtime as needed, may repeat once for muscle spasms 12/10/20  Yes Unknown, Entered By History   fenofibrate (TRIGLIDE/LOFIBRA) 160 MG tablet Take 160 mg by mouth daily  1/19/21  Yes Unknown, Entered By History   folic acid (FOLVITE) 1 MG tablet Take 1 mg by mouth daily before breakfast 3/16/21  Yes Unknown, Entered By History   glipiZIDE (GLUCOTROL XL) 5 MG 24 hr tablet Take 1 tablet by mouth every 24 hours 6/22/21  Yes Unknown, Entered By History   HYDROcodone-acetaminophen 5-325 mg per tablet [HYDROCODONE-ACETAMINOPHEN 5-325 MG PER TABLET] Take 1-2 tablets by mouth every 4 (four) hours as needed for pain.  12/2/16  Yes Provider, Historical   Lidocaine (LIDOCARE) 4 % Patch Place 1 patch onto the skin daily as needed   Yes Unknown, Entered By History   methotrexate 2.5 MG tablet [METHOTREXATE 2.5 MG TABLET] Take 15 mg by mouth once a week. 6 tablets every Friday. 12/7/16  Yes Provider, Historical   metoprolol tartrate (LOPRESSOR) 25 MG tablet [METOPROLOL TARTRATE (LOPRESSOR) 25 MG TABLET] Take 12.5 mg by mouth 2 (two) times a day. 12/7/16  Yes Provider,  Historical   morphine (MS CONTIN) 60 MG 12 hr tablet [MORPHINE (MS CONTIN) 60 MG 12 HR TABLET] Take 60 mg by mouth 2 (two) times a day. 12/2/16  Yes Provider, Historical   naloxone (NARCAN) 4 MG/0.1ML nasal spray Spray 0.1 mLs in nostril as needed spray 0.1 milliliter by intranasal route in 1 nostril may repeat dose every 2-3 minutes as needed alternating nostrils with each dose for emergency use   Yes Unknown, Entered By History   nitroglycerin (NITROSTAT) 0.4 MG SL tablet [NITROGLYCERIN (NITROSTAT) 0.4 MG SL TABLET] Place 0.4 mg under the tongue every 5 (five) minutes as needed for chest pain. 12/7/16  Yes Provider, Historical   OMEGA-3/DHA/EPA/FISH OIL (FISH OIL-OMEGA-3 FATTY ACIDS) 300-1,000 mg capsule [OMEGA-3/DHA/EPA/FISH OIL (FISH OIL-OMEGA-3 FATTY ACIDS) 300-1,000 MG CAPSULE] Take 1 g by mouth 2 (two) times a day.  12/2/16  Yes Provider, Historical   pantoprazole (PROTONIX) 40 MG tablet Take 40 mg by mouth daily  12/7/16  Yes Provider, Historical   polyethylene glycol (MIRALAX) 17 GM/Dose powder Take 17 g by mouth as needed   Yes Unknown, Entered By History   rosuvastatin (CRESTOR) 40 MG tablet Take 40 mg by mouth At Bedtime 8/5/21  Yes Unknown, Entered By History   triamcinolone (KENALOG) 0.1 % external lotion Apply 1 Application topically 2 times daily as needed Apply  topically to affected area(s) 2 times daily prn For face 12/10/20  Yes Unknown, Entered By History   augmented betamethasone dipropionate (DIPROLENE) 0.05 % external lotion Apply topically 2 times daily as needed Apply to scalp as need 12/10/20   Unknown, Entered By History   sertraline (ZOLOFT) 50 MG tablet [SERTRALINE (ZOLOFT) 50 MG TABLET] Take 50 mg by mouth daily.  Patient not taking: Reported on 8/6/2021 12/7/16   Provider, Historical              Review of Systems:   The Review of Systems is negative other than noted in the HPI            Physical Exam:     Patient Vitals for the past 24 hrs:   BP Temp Temp src Pulse Resp SpO2 Height  "Weight   08/07/21 0913 113/67 97.5  F (36.4  C) Oral 63 18 93 % -- --   08/07/21 0411 118/70 98.3  F (36.8  C) Oral 64 18 98 % -- --   08/07/21 0041 (!) 142/75 98.2  F (36.8  C) Oral 69 18 98 % -- --   08/06/21 2240 135/77 98.5  F (36.9  C) Oral 83 18 98 % 1.854 m (6' 1\") 114.8 kg (253 lb)   08/06/21 1539 (!) 152/85 98.5  F (36.9  C) Oral 84 18 97 % -- 110.7 kg (244 lb)          Intake/Output Summary (Last 24 hours) at 8/7/2021 1033  Last data filed at 8/7/2021 1030  Gross per 24 hour   Intake 922 ml   Output 300 ml   Net 622 ml      Constitutional:   awake, alert, cooperative, no apparent distress, and appears stated age       Eyes:   PERRL, conjunctiva/corneas clear, EOM's intact; no scleral edema or icterus noted        ENT:   Normocephalic, without obvious abnormality, atraumatic, Lips, mucosa, and tongue normal        Hematologic / Lymphatic:   No lymphadenopathy       Lungs:   Normal respiratory effort, no accessory muscle use       Cardiovascular:   Regular rate and rhythm       Abdomen:   Soft, obese, ttp throughout, more so on the right, no rebound or guarding or peritoneal signs       Musculoskeletal:   No obvious swelling, bruising or deformity       Skin:   Skin color and texture normal for patient, no rashes or lesions              Data:        Admission on 08/06/2021   Component Date Value     Lipase 08/06/2021 18      Sodium 08/06/2021 137      Potassium 08/06/2021 4.1      Chloride 08/06/2021 106      Carbon Dioxide (CO2) 08/06/2021 21*     Anion Gap 08/06/2021 10      Urea Nitrogen 08/06/2021 11      Creatinine 08/06/2021 0.80      Calcium 08/06/2021 8.8      Glucose 08/06/2021 125      Alkaline Phosphatase 08/06/2021 60      AST 08/06/2021 29      ALT 08/06/2021 32      Protein Total 08/06/2021 7.5      Albumin 08/06/2021 4.3      Bilirubin Total 08/06/2021 1.0      GFR Estimate 08/06/2021 >90      Color Urine 08/06/2021 Yellow      Appearance Urine 08/06/2021 Clear      Glucose Urine 08/06/2021 " Negative      Bilirubin Urine 08/06/2021 Negative      Ketones Urine 08/06/2021 Trace*     Specific Gravity Urine 08/06/2021 1.030      Blood Urine 08/06/2021 Negative      pH Urine 08/06/2021 5.5      Protein Albumin Urine 08/06/2021 10 *     Urobilinogen Urine 08/06/2021 2.0*     Nitrite Urine 08/06/2021 Negative      Leukocyte Esterase Urine 08/06/2021 Negative      Mucus Urine 08/06/2021 Present*     RBC Urine 08/06/2021 1      WBC Urine 08/06/2021 1      Squamous Epithelials Uri* 08/06/2021 <1      Bilirubin Total 08/06/2021 1.0      Bilirubin Direct 08/06/2021 0.3      Protein Total 08/06/2021 7.5      Albumin 08/06/2021 4.3      Alkaline Phosphatase 08/06/2021 60      AST 08/06/2021 29      ALT 08/06/2021 32      WBC Count 08/06/2021 9.7      RBC Count 08/06/2021 4.56      Hemoglobin 08/06/2021 14.7      Hematocrit 08/06/2021 43.4      MCV 08/06/2021 95      MCH 08/06/2021 32.2      MCHC 08/06/2021 33.9      RDW 08/06/2021 12.8      Platelet Count 08/06/2021 179      % Neutrophils 08/06/2021 59      % Lymphocytes 08/06/2021 29      % Monocytes 08/06/2021 9      % Eosinophils 08/06/2021 3      % Basophils 08/06/2021 0      % Immature Granulocytes 08/06/2021 0      NRBCs per 100 WBC 08/06/2021 0      Absolute Neutrophils 08/06/2021 5.8      Absolute Lymphocytes 08/06/2021 2.8      Absolute Monocytes 08/06/2021 0.8      Absolute Eosinophils 08/06/2021 0.3      Absolute Basophils 08/06/2021 0.0      Absolute Immature Granul* 08/06/2021 0.0      Absolute NRBCs 08/06/2021 0.0      SARS CoV2 PCR 08/06/2021 Negative      Sodium 08/07/2021 138      Potassium 08/07/2021 3.8      Chloride 08/07/2021 106      Carbon Dioxide (CO2) 08/07/2021 25      Anion Gap 08/07/2021 7      Urea Nitrogen 08/07/2021 10      Creatinine 08/07/2021 0.75      Calcium 08/07/2021 8.5      Glucose 08/07/2021 120      Alkaline Phosphatase 08/07/2021 59      AST 08/07/2021 23      ALT 08/07/2021 25      Protein Total 08/07/2021 6.7      Albumin  08/07/2021 3.8      Bilirubin Total 08/07/2021 1.2*     GFR Estimate 08/07/2021 >90      Magnesium 08/06/2021 1.8      GLUCOSE BY METER POCT 08/07/2021 117      WBC Count 08/07/2021 8.2      RBC Count 08/07/2021 4.18*     Hemoglobin 08/07/2021 13.8      Hematocrit 08/07/2021 40.2      MCV 08/07/2021 96      MCH 08/07/2021 33.0      MCHC 08/07/2021 34.3      RDW 08/07/2021 12.8      Platelet Count 08/07/2021 193      % Neutrophils 08/07/2021 59      % Lymphocytes 08/07/2021 29      % Monocytes 08/07/2021 8      % Eosinophils 08/07/2021 4      % Basophils 08/07/2021 0      % Immature Granulocytes 08/07/2021 0      NRBCs per 100 WBC 08/07/2021 0      Absolute Neutrophils 08/07/2021 4.8      Absolute Lymphocytes 08/07/2021 2.3      Absolute Monocytes 08/07/2021 0.7      Absolute Eosinophils 08/07/2021 0.3      Absolute Basophils 08/07/2021 0.0      Absolute Immature Granul* 08/07/2021 0.0      Absolute NRBCs 08/07/2021 0.0          All imaging studies reviewed by me.

## 2021-08-07 NOTE — PLAN OF CARE
"PRIMARY DIAGNOSIS: \"GENERIC\" NURSING  OUTPATIENT/OBSERVATION GOALS TO BE MET BEFORE DISCHARGE:  ADLs back to baseline: Yes    Activity and level of assistance: Ambulating independently.    Pain status: No improvement noted. Consider adjustment in pain regimen.    Return to near baseline physical activity: Yes     Discharge Planner Nurse   Safe discharge environment identified: Yes  Barriers to discharge: Yes       Entered by: Consuelo Isabel 08/07/2021 11:46 AM     Please review provider order for any additional goals.   Nurse to notify provider when observation goals have been met and patient is ready for discharge.  "

## 2021-08-07 NOTE — ED PROVIDER NOTES
EMERGENCY DEPARTMENT ENCOUNTER      NAME: Shannan Kothari  AGE: 55 year old male  YOB: 1965  MRN: [id number]  EVALUATION DATE & TIME: 8/6/2021  6:54 PM    PCP: No primary care provider on file.    ED PROVIDER: Michael Lee D.O.      Chief Complaint   Patient presents with     Abdominal Pain     Flank Pain       HPI    Patient information was obtained from: Patient    Use of : N/A       Shannan Kothari is a 55 year old male with a pertinent medical history of kidney stones, C. diff, diabetes mellitus type 2, rheumatoid arthritis and s/p cholecystectomy (2016) who presents via walk in for evaluation of abdominal pain.    For the past three days, the patient reports worsening abdominal pain that radiates into his right flank and pelvic area.  He currently rates the pain at 8/10, but mentions it was worse than 10/10 yesterday. The patient states that he feels nauseous secondary to the pain. Patient endorses diarrhea every 5-6 weeks that last for a few days. He mentions that he has had diarrhea for the last few days and was on the toilet from 01:00 to 06:00 this morning. The patient states that he will have diarrhea for a few days and then not have a bowel movement for a few days after. The patient also states that he feels bloated and was sweating all of last night.    Patient reports a past history of kidney stones, and states his current pain reminds him of that. He denies any history of kidney disease. He also reports that he had C. diff in September of 2020, and says his current diarrhea reminds him of that. The patient states he was also diagnosed with diabetes mellitus last September.     The patient states he has not ate since before 12:00 yesterday because eating makes his pain worse. He reports chronic back pain, and mentions he had back pain last night, but states his back does not feel any worse than baseline at present. The patient endorses a headache, but denies any vision changes.  Patient denies any cough, congestion, sore throat, hematuria, dysuria, numbness, tingling, or any other symptoms at this time.     REVIEW OF SYSTEMS  Constitutional:  Denies fever, chills, weight loss or weakness  Eyes:  No pain, discharge, redness  HENT:  Denies sore throat, ear pain, congestion  Respiratory: No SOB, wheeze or cough  Cardiovascular:  No CP, palpitations  GI:  Denies vomiting. Positive for abdominal pain, distention, nausea, and diarrhea.  : Denies dysuria, hematuria  Musculoskeletal:  Denies any swelling or loss of function. Positive for right flank pain. Positive for back pain (last night)  Skin:  Denies rash, pallor  Neurologic:  Denies focal weakness or sensory changes. Positive for headache.  Lymph: Denies swollen nodes    All other systems negative unless noted in HPI.    PAST MEDICAL HISTORY:  History reviewed. No pertinent past medical history.    PAST SURGICAL HISTORY:  Past Surgical History:   Procedure Laterality Date     CORONARY STENT PLACEMENT       LAPAROSCOPIC CHOLECYSTECTOMY N/A 12/7/2016    Procedure: CHOLECYSTECTOMY LAPAROSCOPIC;  Surgeon: Jimmy Lockwood MD;  Location: [facility];  Service:      REPAIR CLEFT PALATE ADULT         CURRENT MEDICATIONS:    No current facility-administered medications for this encounter.         ALLERGIES:  No Known Allergies    FAMILY HISTORY:  No family history on file.    SOCIAL HISTORY:  Social History     Socioeconomic History     Marital status:      Spouse name: Not on file     Number of children: Not on file     Years of education: Not on file     Highest education level: Not on file   Occupational History     Not on file   Tobacco Use     Smoking status: Never Smoker   Substance and Sexual Activity     Alcohol use: No     Drug use: No     Sexual activity: Not on file   Other Topics Concern     Not on file   Social History Narrative     Not on file     Social Determinants of Health     Financial Resource Strain:       Difficulty of Paying Living Expenses:    Food Insecurity:      Worried About Running Out of Food in the Last Year:      Ran Out of Food in the Last Year:    Transportation Needs:      Lack of Transportation (Medical):      Lack of Transportation (Non-Medical):    Physical Activity:      Days of Exercise per Week:      Minutes of Exercise per Session:    Stress:      Feeling of Stress :    Social Connections:      Frequency of Communication with Friends and Family:      Frequency of Social Gatherings with Friends and Family:      Attends Je[employer] witness Services:      Active Member of Clubs or Organizations:      Attends Club or Organization Meetings:      Marital Status:    Intimate Partner Violence:      Fear of Current or Ex-Partner:      Emotionally Abused:      Physically Abused:      Sexually Abused:        VITALS:  Patient Vitals for the past 24 hrs:   BP Temp Temp src Pulse Resp SpO2 Weight   08/06/21 1539 (!) 152/85 98.5  F (36.9  C) Oral 84 18 97 % 110.7 kg (244 lb)       PHYSICAL EXAM    VITAL SIGNS: BP (!) 152/85   Pulse 84   Temp 98.5  F (36.9  C) (Oral)   Resp 18   Wt 110.7 kg (244 lb)   SpO2 97%   BMI 33.09 kg/m      General Appearance: Well-appearing, well-nourished, no acute distress  Head:  Normocephalic, without obvious abnormality, atraumatic  Eyes:  PERRL, conjunctiva/corneas clear, EOM's intact,  ENT:  Lips, mucosa, and tongue normal, membranes are moist without pallor  Neck:  Normal ROM, symmetrical, trachea midline    Cardio:  Regular rate and rhythm, no murmur, rub or gallop, 2+ pulses symmetric in all extremities  Pulm:  Clear to auscultation bilaterally, respirations unlabored,  Back:  ROM normal, no spinal tenderness, no paraspinal tenderness. Mild right CVA tenderness.  Abdomen:  Soft. Mild RLQ tenderness without rebound or guarding.  Musculoskeletal: Full ROM, no edema, no cyanosis, good ROM of major joints  Integument:  Warm, Dry, No erythema, No rash.    Neurologic:  Alert & oriented.   No focal deficits appreciated.  Ambulatory.  Psychiatric:  Affect normal, Judgment normal, Mood normal.      LABS  Results for orders placed or performed during the hospital encounter of 08/06/21 (from the past 24 hour(s))   Lipase   Result Value Ref Range    Lipase 18 0 - 52 U/L   Comprehensive metabolic panel   Result Value Ref Range    Sodium 137 136 - 145 mmol/L    Potassium 4.1 3.5 - 5.0 mmol/L    Chloride 106 98 - 107 mmol/L    Carbon Dioxide (CO2) 21 (L) 22 - 31 mmol/L    Anion Gap 10 5 - 18 mmol/L    Urea Nitrogen 11 8 - 22 mg/dL    Creatinine 0.80 0.70 - 1.30 mg/dL    Calcium 8.8 8.5 - 10.5 mg/dL    Glucose 125 70 - 125 mg/dL    Alkaline Phosphatase 60 45 - 120 U/L    AST 29 0 - 40 U/L    ALT 32 0 - 45 U/L    Protein Total 7.5 6.0 - 8.0 g/dL    Albumin 4.3 3.5 - 5.0 g/dL    Bilirubin Total 1.0 0.0 - 1.0 mg/dL    GFR Estimate >90 >60 mL/min/1.73m2   CBC with platelets differential *Canceled*    Narrative    The following orders were created for panel order CBC with platelets differential.  Procedure                               Abnormality         Status                     ---------                               -----------         ------                     CBC with platelets and d...[[id number]]                                                   Please view results for these tests on the individual orders.   CBC with platelets + differential    Narrative    The following orders were created for panel order CBC with platelets + differential.  Procedure                               Abnormality         Status                     ---------                               -----------         ------                     CBC with platelets and d...[[id number]]                      Final result                 Please view results for these tests on the individual orders.   Hepatic function panel   Result Value Ref Range    Bilirubin Total 1.0 0.0 - 1.0 mg/dL    Bilirubin Direct 0.3 <=0.5 mg/dL    Protein Total 7.5  6.0 - 8.0 g/dL    Albumin 4.3 3.5 - 5.0 g/dL    Alkaline Phosphatase 60 45 - 120 U/L    AST 29 0 - 40 U/L    ALT 32 0 - 45 U/L   CBC with platelets and differential   Result Value Ref Range    WBC Count 9.7 4.0 - 11.0 10e3/uL    RBC Count 4.56 4.40 - 5.90 10e6/uL    Hemoglobin 14.7 13.3 - 17.7 g/dL    Hematocrit 43.4 40.0 - 53.0 %    MCV 95 78 - 100 fL    MCH 32.2 26.5 - 33.0 pg    MCHC 33.9 31.5 - 36.5 g/dL    RDW 12.8 10.0 - 15.0 %    Platelet Count 179 150 - 450 10e3/uL    % Neutrophils 59 %    % Lymphocytes 29 %    % Monocytes 9 %    % Eosinophils 3 %    % Basophils 0 %    % Immature Granulocytes 0 %    NRBCs per 100 WBC 0 <1 /100    Absolute Neutrophils 5.8 1.6 - 8.3 10e3/uL    Absolute Lymphocytes 2.8 0.8 - 5.3 10e3/uL    Absolute Monocytes 0.8 0.0 - 1.3 10e3/uL    Absolute Eosinophils 0.3 0.0 - 0.7 10e3/uL    Absolute Basophils 0.0 0.0 - 0.2 10e3/uL    Absolute Immature Granulocytes 0.0 <=0.0 10e3/uL    Absolute NRBCs 0.0 10e3/uL   CT Abdomen Pelvis w/o Contrast    Narrative    EXAM: CT ABDOMEN PELVIS W/O CONTRAST  LOCATION: [facility]  DATE/TIME: 8/6/2021 7:33 PM    INDICATION: Flank pain, kidney stone suspected  COMPARISON: None.  TECHNIQUE: CT scan of the abdomen and pelvis was performed without IV contrast. Multiplanar reformats were obtained. Dose reduction techniques were used.  CONTRAST: None.    FINDINGS:   LOWER CHEST: Coronary artery disease.     HEPATOBILIARY: Hepatic steatosis. Cholecystectomy.     PANCREAS: Normal.    SPLEEN: Normal.    ADRENAL GLANDS: Normal.    KIDNEYS/BLADDER: No renal or ureteral calculi. No hydronephrosis or hydroureter. Normal urinary bladder.     BOWEL: Normal stomach. A few loops of small bowel in the anterior abdomen are mildly distended with a maximum diameter of 3.1 m. Air-fluid levels are present. There is change in caliber in the upper pelvis just to the right of midline (axial series 3   image 153 and coronal series 5 image 55).  More  distally the small bowel is normal in caliber. Normal appendix. Normal appearance of the colon with air-fluid levels. No pneumatosis.    LYMPH NODES: Normal.    VASCULATURE: Atherosclerotic disease. No portal venous gas.    PELVIC ORGANS: Normal.    OTHER: There is a small amount of free fluid in the pelvis.    MUSCULOSKELETAL: Normal.        Impression    IMPRESSION:   1.  No renal or ureteral calculi. No hydronephrosis or hydroureter.  2.  Possible partial or mild small bowel obstruction. Adhesive disease is suspected. No pneumatosis or portal venous gas. A small amount of free fluid in the pelvis may be related. Note that paralytic ileus could have a similar appearance.  3.  Hepatic steatosis.      Social Work/ Care Management IP Consult    Narrative    Marialuisa Sandhu RN     8/6/2021  9:06 PM  Care Management Initial Consult    General Information  Assessment completed with: Patient,    Type of CM/SW Visit: Initial Assessment    Primary Care Provider verified and updated as needed:     Readmission within the last 30 days:        Reason for Consult: discharge planning  Advance Care Planning:            Communication Assessment  Patient's communication style: spoken language (English or   Bilingual)    Hearing Difficulty or Deaf: no   Wear Glasses or Blind: no    Cognitive  Cognitive/Neuro/Behavioral: WDL                      Living Environment:   People in home: child(angi), adult, grandchild(anig)     Current living Arrangements: house      Able to return to prior arrangements: yes       Family/Social Support:  Care provided by: self  Provides care for: no one  Marital Status:   Children          Description of Support System: Supportive         Current Resources:   Patient receiving home care services: No     Community Resources: None  Equipment currently used at home: none  Supplies currently used at home: None    Employment/Financial:  Employment Status: retired        Financial Concerns:              Lifestyle & Psychosocial Needs:  Social Determinants of Health     Tobacco Use: Unknown     Smoking Tobacco Use: Never Smoker     Smokeless Tobacco Use: Unknown   Alcohol Use:      Frequency of Alcohol Consumption:      Average Number of Drinks:      Frequency of Binge Drinking:    Financial Resource Strain:      Difficulty of Paying Living Expenses:    Food Insecurity:      Worried About Running Out of Food in the Last Year:      Ran Out of Food in the Last Year:    Transportation Needs:      Lack of Transportation (Medical):      Lack of Transportation (Non-Medical):    Physical Activity:      Days of Exercise per Week:      Minutes of Exercise per Session:    Stress:      Feeling of Stress :    Social Connections:      Frequency of Communication with Friends and Family:      Frequency of Social Gatherings with Friends and Family:      Attends Synagogue Services:      Active Member of Clubs or Organizations:      Attends Club or Organization Meetings:      Marital Status:    Intimate Partner Violence:      Fear of Current or Ex-Partner:      Emotionally Abused:      Physically Abused:      Sexually Abused:    Depression:      PHQ-2 Score:    Housing Stability:      Unable to Pay for Housing in the Last Year:      Number of Places Lived in the Last Year:      Unstable Housing in the Last Year:        Functional Status:  Prior to admission patient needed assistance:   Dependent ADLs:: Independent  Dependent IADLs:: Independent  Assesssment of Functional Status: At functional baseline    Mental Health Status:          Chemical Dependency Status:                Values/Beliefs:  Spiritual, Cultural Beliefs, Synagogue Practices, Values that   affect care:                 Additional Information:  AIDET completed. Pt lives in a private residence, his dtr and   grandchildren live with him. He is independent with all ADL's,   has no services and no DME used. Anticipate pt will DC back home   without needs. Family will  transport upon DC. Informed that CM   will follow progression of care.   Marialuisa Sandhu RNCM      Abbreviation Code:  [facility] Home care (HC), Patient (Pt),   Transitional Care Unit (TCU), Skilled Nursing Facility (SNF),   Assisted Living (AL), Independent Living (IL), Physical Therapy   (PT), Occupational Therapy (OT)        Marialuisa Sandhu RN       Asymptomatic COVID-19 Virus (Coronavirus) by PCR Nasopharyngeal    Specimen: Nasopharyngeal; Swab    Narrative    The following orders were created for panel order Asymptomatic COVID-19 Virus (Coronavirus) by PCR Nasopharyngeal.  Procedure                               Abnormality         Status                     ---------                               -----------         ------                     SARS-COV2 (COVID-19) Vir...[[id number]]                      In process                   Please view results for these tests on the individual orders.         RADIOLOGY  CT Abdomen Pelvis w/o Contrast   Final Result   IMPRESSION:    1.  No renal or ureteral calculi. No hydronephrosis or hydroureter.   2.  Possible partial or mild small bowel obstruction. Adhesive disease is suspected. No pneumatosis or portal venous gas. A small amount of free fluid in the pelvis may be related. Note that paralytic ileus could have a similar appearance.   3.  Hepatic steatosis.                   FINAL IMPRESSION:  1. Partial small bowel obstruction (H)          ED COURSE & MEDICAL DECISION MAKIN:01 PM I met with the patient to gather history and to perform my initial exam. I discussed the plan for care while in the Emergency Department.  8:59 PM Spoke with  Dr. Rankin, general surgery.  9:06 PM Rechecked the patient and updated on results. Discussed plan for admission - patient agreeable.  10:06 PM Spoke with Dr. Sierra, hospitalist.    Pertinent Labs & Imaging studies reviewed. (See chart for details)  55 year old male presents to the Emergency  Department for evaluation of right abdominal pain and flank pain.  Patient reported severe pain, with intermittent episodes.  He does also report some diarrhea this morning but has not while in the emergency department.  He denies any dysuria or hematuria, but based on his symptoms I was initially concerned for the potential for a kidney stone.  Therefore, despite a UA that did not show any evidence that would be concerning for stone, a CT scan was performed.  The CT scan does not show any evidence of kidney stone, but does show signs are concerning for possible mild small bowel obstruction potentially secondary to adhesions.  Because of this, I did consult with general surgery.  Plan is for observation overnight and general surgery consult in the morning.  Patient was agreeable with this plan.  Discussed with the hospitalist who agreed to the observation.    At the conclusion of the encounter I discussed the results of all of the tests and the disposition. The questions were answered. The patient or family acknowledged understanding and was agreeable with the care plan.      MEDICATIONS GIVEN IN THE EMERGENCY:  Medications - No data to display    NEW PRESCRIPTIONS STARTED AT TODAY'S ER VISIT  Current Discharge Medication List           Michael Lee D.O.  Emergency Medicine  [facility] EMERGENCY ROOM  [address] [facility] [id number] [phone]  Dept: [phone]     Michael Lee,   08/06/21 0603

## 2021-08-07 NOTE — PHARMACY-ADMISSION MEDICATION HISTORY
Pharmacy Note - Admission Medication History    Pertinent Provider Information:      ______________________________________________________________________    Prior To Admission (PTA) med list completed and updated in EMR.       PTA Med List   Medication Sig Last Dose     aspirin 81 mg chewable tablet [ASPIRIN 81 MG CHEWABLE TABLET] Chew 81 mg daily. 8/5/2021 at Unknown time     atorvastatin (LIPITOR) 80 MG tablet [ATORVASTATIN (LIPITOR) 80 MG TABLET] Take 80 mg by mouth bedtime. 8/5/2021     clopidogrel (PLAVIX) 75 mg tablet [CLOPIDOGREL (PLAVIX) 75 MG TABLET] Take 75 mg by mouth daily. 8/5/2021     cyclobenzaprine (FLEXERIL) 10 MG tablet Take 10 mg by mouth at bedtime as needed, may repeat once for muscle spasms 8/5/2021 at Unknown time     fenofibrate (TRIGLIDE/LOFIBRA) 160 MG tablet Take 160 mg by mouth daily  8/5/2021 at Unknown time     folic acid (FOLVITE) 1 MG tablet Take 1 mg by mouth daily before breakfast 8/5/2021 at Unknown time     glipiZIDE (GLUCOTROL XL) 5 MG 24 hr tablet Take 1 tablet by mouth every 24 hours 8/5/2021 at Unknown time     HYDROcodone-acetaminophen 5-325 mg per tablet [HYDROCODONE-ACETAMINOPHEN 5-325 MG PER TABLET] Take 1-2 tablets by mouth every 4 (four) hours as needed for pain.  8/5/2021     Lidocaine (LIDOCARE) 4 % Patch Place 1 patch onto the skin daily as needed Past Week at Unknown time     methotrexate 2.5 MG tablet [METHOTREXATE 2.5 MG TABLET] Take 15 mg by mouth once a week. 6 tablets every Friday. 7/30/2021     metoprolol tartrate (LOPRESSOR) 25 MG tablet [METOPROLOL TARTRATE (LOPRESSOR) 25 MG TABLET] Take 12.5 mg by mouth 2 (two) times a day. 8/5/2021 at Unknown time     morphine (MS CONTIN) 60 MG 12 hr tablet [MORPHINE (MS CONTIN) 60 MG 12 HR TABLET] Take 60 mg by mouth 2 (two) times a day. 8/6/2021 at amx1     naloxone (NARCAN) 4 MG/0.1ML nasal spray Spray 0.1 mLs in nostril as needed spray 0.1 milliliter by intranasal route in 1 nostril may repeat dose every 2-3 minutes  as needed alternating nostrils with each dose for emergency use      nitroglycerin (NITROSTAT) 0.4 MG SL tablet [NITROGLYCERIN (NITROSTAT) 0.4 MG SL TABLET] Place 0.4 mg under the tongue every 5 (five) minutes as needed for chest pain.      OMEGA-3/DHA/EPA/FISH OIL (FISH OIL-OMEGA-3 FATTY ACIDS) 300-1,000 mg capsule [OMEGA-3/DHA/EPA/FISH OIL (FISH OIL-OMEGA-3 FATTY ACIDS) 300-1,000 MG CAPSULE] Take 1 g by mouth 2 (two) times a day.  8/5/2021     pantoprazole (PROTONIX) 40 MG tablet Take 40 mg by mouth daily  8/5/2021     polyethylene glycol (MIRALAX) 17 GM/Dose powder Take 17 g by mouth as needed Past Week     rosuvastatin (CRESTOR) 40 MG tablet Take 40 mg by mouth At Bedtime 8/5/2021 at Unknown time     triamcinolone (KENALOG) 0.1 % external lotion Apply 1 Application topically 2 times daily as needed Apply  topically to affected area(s) 2 times daily prn For face        Information source(s): Patient  Method of interview communication: in-person    Summary of Changes to PTA Med List  New: Flexeril, fenofibrate, folic acid, lidocaine patch, Crestor, colace, miralax, betamethasone lotion, TMC lotion  Discontinued:   Changed:    Patient was asked about OTC/herbal products specifically.  PTA med list reflects this.    In the past week, patient estimated taking medication this percent of the time:  greater than 90%.    Allergies were reviewed, assessed, and updated with the patient.      Patient does not use any multi-dose medications prior to admission.    The information provided in this note is only as accurate as the sources available at the time of the update(s).    Thank you for the opportunity to participate in the care of this patient.    Amanda Vargas, PharmTRELL  8/6/2021 9:51 PM

## 2021-08-07 NOTE — UTILIZATION REVIEW
Admission Status; Secondary Review Determination   Under the authority of the Utilization Management Committee, the utilization review process indicated a secondary review on Shannan Kothari. The review outcome is based on review of the medical records, discussions with staff, and applying clinical experience noted on the date of the review.   (x) Inpatient Status Appropriate - This patient's medical care is consistent with medical management for inpatient care and reasonable inpatient medical practice.     RATIONALE FOR DETERMINATION   55 year old male with past medical history of chronic back pain, on chronic opioid therapy, DM, CAD s/p cardiac stent on Plavix, psoriatic arthritis on methotrexate, C diff in 9/2020 who presented to ER with acute on chronic abdominal pain, nausea. Admitted with partial small bowel obstruction vs ileus. GI and surgery consult , still NPO, on IV fluid and pain control, crossing 2nd midnight      At the time of admission with the information available to the attending physician more than 2 nights Hospital complex care was anticipated, based on patient risk of adverse outcome if treated as outpatient and complex care required. Inpatient admission is appropriate based on the Medicare guidelines.   The information on this document is developed by the utilization review team in order for the business office to ensure compliance. This only denotes the appropriateness of proper admission status and does not reflect the quality of care rendered.   The definitions of Inpatient Status and Observation Status used in making the determination above are those provided in the CMS Coverage Manual, Chapter 1 and Chapter 6, section 70.4.   Sincerely,   Niko Garcia MD  Utilization Review  Physician Advisor  [facility]

## 2021-08-07 NOTE — H&P
[facility] MEDICINE ADMISSION HISTORY AND PHYSICAL       Assessment & Plan      1. Crampy abdominal pain, bloatedness, and diarrhea. CT scan showed partial SBO or ileus. Surgery was consulted by ED. No renal stones. No blood in UA. His GB is gone r/o Malabsorption issue, r/o IBS    - Check C diff and stool culture  - IVF, pain meds, and anti emetics  - Surgery was consulted in ED  - AM labs, check lactic acid as well   - He is being seen by Dr Barahona from [facility], and he wanted them to see him, as he expressed disappointment, given recurrent nature of his condition. He said, if wont eat, his belly symptoms improve.  - Simethicone PRN    2. Chronic narcotic use on Morphine BID --  Could contribute. He was advised.   3. DM2 - sliding scale insulin. May need to hold DM meds while NPO   4. CAD - no CP or SOB     5. Psoriatic arthropathy -- on methotrexate ---- Pls ask patient in AM next dose, he takes this once a week      VTE prophylaxis: ambulate   IV fluids: Per order set   Diet:  NPO for now,  GI prophylaxis: Not indicated at this point, re-assess if needed.   Pain, sleep, and vomiting medications: Per order set  Code Status: Full  COVID test result:  negative   COVID vaccination: completed   Barriers to discharge: admitting clinical condition  Discharge Disposition and goals:  Unable to determine at this point, pending clinical progress and response to treatment. Patient may need transfer to SNF or [facility] if unsafe to go home and needed treatment inappropriate at home setting OR may need home health care evaluation if care can be delivered at home settings. Consider referral to care manager/        Care plan was created based on available information provided, including patient's condition at the time of encounter.   This plan was discussed with patient and/or family members using layman's terms and have agreed to proceed.   At the end of night shift (9PM - 730A), this case will be  presented to the AM Hospitalist.    It is recommended to revise care plan if there is change in condition and/or new clinical information that are not available during my encounter.     All or some of home medication/s were not resumed on admission due to safety reasons or contraindications. Dosing and frequency may also have been modified. Please resume/review them during your visit.     70 minutes of total visit duration and greater than 50% was spent in direct evaluation of patient and coordination of care including discussion of diagnostic test results and recommended treatment. .      Jamie Sierra MD, MPH, FACP, [facility]  Internal Medicine - Hospitalist        Chief Complaint Abdominal pain      HISTORY     - Met him and he was comfortable. He came in with 2-3 days of abdominal pain, crampy, and with diarrhea At least 5 episodes per day. Non bloody. No fever. No urinary symptoms. He also mentioned pain on the right side of the abdomen, he thought might be kidney stone. He feels bloated.   - He had this issue in the past with abdominal cramping, diarrhea and it will go away the come back. He saw  GI evaluated him in the past and couldn't give him an answer.   - He had C diff  in the past and was worried about current symptoms.  - His pain was so bad and he decided to come. Has nausea and no vomiting.   - No chest pain. No cough. No colds.   - In the ED, CT scan showed -- No renal or ureteral calculi. No hydronephrosis or hydroureter. Partial SBO VS ileus. UA no blood.   - ROS --- No headache. No dizziness. No weakness. No CP or SOB. No palpitations.  No urinary symptoms. No bleeding symptoms. No weight loss. Rest of 12 point ROS was reviewed and negative.       Past Medical History     CAD/DM     Surgical History     Past Surgical History:   Procedure Laterality Date     CORONARY STENT PLACEMENT       LAPAROSCOPIC CHOLECYSTECTOMY N/A 12/7/2016    Procedure: CHOLECYSTECTOMY LAPAROSCOPIC;  Surgeon: Jimmy ALVARADO  MD Mandie;  Location: [facility] Main OR;  Service:      REPAIR CLEFT PALATE ADULT          Family History      No colon CA       Social History      .  Social History     Socioeconomic History     Marital status:      Spouse name: Not on file     Number of children: Not on file     Years of education: Not on file     Highest education level: Not on file   Occupational History     Not on file   Tobacco Use     Smoking status: Never Smoker   Substance and Sexual Activity     Alcohol use: No     Drug use: No     Sexual activity: Not on file   Other Topics Concern     Not on file   Social History Narrative     Not on file     Social Determinants of Health     Financial Resource Strain:      Difficulty of Paying Living Expenses:    Food Insecurity:      Worried About Running Out of Food in the Last Year:      Ran Out of Food in the Last Year:    Transportation Needs:      Lack of Transportation (Medical):      Lack of Transportation (Non-Medical):    Physical Activity:      Days of Exercise per Week:      Minutes of Exercise per Session:    Stress:      Feeling of Stress :    Social Connections:      Frequency of Communication with Friends and Family:      Frequency of Social Gatherings with Friends and Family:      Attends Temple Services:      Active Member of Clubs or Organizations:      Attends Club or Organization Meetings:      Marital Status:    Intimate Partner Violence:      Fear of Current or Ex-Partner:      Emotionally Abused:      Physically Abused:      Sexually Abused:           Allergies      No Known Allergies      Prior to Admission Medications      No current facility-administered medications on file prior to encounter.  aspirin 81 mg chewable tablet, [ASPIRIN 81 MG CHEWABLE TABLET] Chew 81 mg daily.  atorvastatin (LIPITOR) 80 MG tablet, [ATORVASTATIN (LIPITOR) 80 MG TABLET] Take 80 mg by mouth bedtime.  clopidogrel (PLAVIX) 75 mg tablet, [CLOPIDOGREL (PLAVIX) 75 MG TABLET] Take 75 mg by  mouth daily.  cyclobenzaprine (FLEXERIL) 10 MG tablet, Take 10 mg by mouth at bedtime as needed, may repeat once for muscle spasms  fenofibrate (TRIGLIDE/LOFIBRA) 160 MG tablet, Take 160 mg by mouth daily   folic acid (FOLVITE) 1 MG tablet, Take 1 mg by mouth daily before breakfast  glipiZIDE (GLUCOTROL XL) 5 MG 24 hr tablet, Take 1 tablet by mouth every 24 hours  HYDROcodone-acetaminophen 5-325 mg per tablet, [HYDROCODONE-ACETAMINOPHEN 5-325 MG PER TABLET] Take 1-2 tablets by mouth every 4 (four) hours as needed for pain.   Lidocaine (LIDOCARE) 4 % Patch, Place 1 patch onto the skin daily as needed  methotrexate 2.5 MG tablet, [METHOTREXATE 2.5 MG TABLET] Take 15 mg by mouth once a week. 6 tablets every Friday.  metoprolol tartrate (LOPRESSOR) 25 MG tablet, [METOPROLOL TARTRATE (LOPRESSOR) 25 MG TABLET] Take 12.5 mg by mouth 2 (two) times a day.  morphine (MS CONTIN) 60 MG 12 hr tablet, [MORPHINE (MS CONTIN) 60 MG 12 HR TABLET] Take 60 mg by mouth 2 (two) times a day.  naloxone (NARCAN) 4 MG/0.1ML nasal spray, Spray 0.1 mLs in nostril as needed spray 0.1 milliliter by intranasal route in 1 nostril may repeat dose every 2-3 minutes as needed alternating nostrils with each dose for emergency use  nitroglycerin (NITROSTAT) 0.4 MG SL tablet, [NITROGLYCERIN (NITROSTAT) 0.4 MG SL TABLET] Place 0.4 mg under the tongue every 5 (five) minutes as needed for chest pain.  OMEGA-3/DHA/EPA/FISH OIL (FISH OIL-OMEGA-3 FATTY ACIDS) 300-1,000 mg capsule, [OMEGA-3/DHA/EPA/FISH OIL (FISH OIL-OMEGA-3 FATTY ACIDS) 300-1,000 MG CAPSULE] Take 1 g by mouth 2 (two) times a day.   pantoprazole (PROTONIX) 40 MG tablet, Take 40 mg by mouth daily   polyethylene glycol (MIRALAX) 17 GM/Dose powder, Take 17 g by mouth as needed  rosuvastatin (CRESTOR) 40 MG tablet, Take 40 mg by mouth At Bedtime  triamcinolone (KENALOG) 0.1 % external lotion, Apply 1 Application topically 2 times daily as needed Apply  topically to affected area(s) 2 times daily  prn For face  augmented betamethasone dipropionate (DIPROLENE) 0.05 % external lotion, Apply topically 2 times daily as needed Apply to scalp as need  sertraline (ZOLOFT) 50 MG tablet, [SERTRALINE (ZOLOFT) 50 MG TABLET] Take 50 mg by mouth daily. (Patient not taking: Reported on 8/6/2021)            Review of Systems     A 12 point comprehensive review of systems was negative except as noted above in HPI.    PHYSICAL EXAMINATION       Vitals      Vitals: BP (!) 152/85   Pulse 84   Temp 98.5  F (36.9  C) (Oral)   Resp 18   Wt 110.7 kg (244 lb)   SpO2 97%   BMI 33.09 kg/m    BMI= Body mass index is 33.09 kg/m .      Examination     General Appearance:  Alert, cooperative, no distress  Head:    Normocephalic, without obvious abnormality, atraumatic  EENT:  PERRL, conjunctiva/corneas clear, EOM's intact.   Neck:   Supple, symmetrical, trachea midline, no adenopathy; no NVE  Back:  Symmetric, no curvature, no CVA tenderness  Chest/Lungs: Clear to auscultation bilaterally, respirations unlabored, No tenderness or deformity. No abdominal breathing or use of accessory muscles.   Heart:    Regular rate and rhythm, S1 and S2 normal, no murmur, rub   or gallop  Abdomen: Soft, non-tender, bowel sounds active all four quadrants, not peritoneal on palpation. Not distended  Extremities:  Extremities normal, atraumatic, no swelling   Skin:  Skin color, texture, turgor normal, no rashes or lesion  Neurologic:  Awake and alert, No lateralizing or localizing signs      Pertinent Lab     Results for orders placed or performed during the hospital encounter of 08/06/21   CT Abdomen Pelvis w/o Contrast    Impression    IMPRESSION:   1.  No renal or ureteral calculi. No hydronephrosis or hydroureter.  2.  Possible partial or mild small bowel obstruction. Adhesive disease is suspected. No pneumatosis or portal venous gas. A small amount of free fluid in the pelvis may be related. Note that paralytic ileus could have a similar  appearance.  3.  Hepatic steatosis.      Result Value Ref Range    Lipase 18 0 - 52 U/L   Comprehensive metabolic panel   Result Value Ref Range    Sodium 137 136 - 145 mmol/L    Potassium 4.1 3.5 - 5.0 mmol/L    Chloride 106 98 - 107 mmol/L    Carbon Dioxide (CO2) 21 (L) 22 - 31 mmol/L    Anion Gap 10 5 - 18 mmol/L    Urea Nitrogen 11 8 - 22 mg/dL    Creatinine 0.80 0.70 - 1.30 mg/dL    Calcium 8.8 8.5 - 10.5 mg/dL    Glucose 125 70 - 125 mg/dL    Alkaline Phosphatase 60 45 - 120 U/L    AST 29 0 - 40 U/L    ALT 32 0 - 45 U/L    Protein Total 7.5 6.0 - 8.0 g/dL    Albumin 4.3 3.5 - 5.0 g/dL    Bilirubin Total 1.0 0.0 - 1.0 mg/dL    GFR Estimate >90 >60 mL/min/1.73m2   Hepatic function panel   Result Value Ref Range    Bilirubin Total 1.0 0.0 - 1.0 mg/dL    Bilirubin Direct 0.3 <=0.5 mg/dL    Protein Total 7.5 6.0 - 8.0 g/dL    Albumin 4.3 3.5 - 5.0 g/dL    Alkaline Phosphatase 60 45 - 120 U/L    AST 29 0 - 40 U/L    ALT 32 0 - 45 U/L   CBC with platelets and differential   Result Value Ref Range    WBC Count 9.7 4.0 - 11.0 10e3/uL    RBC Count 4.56 4.40 - 5.90 10e6/uL    Hemoglobin 14.7 13.3 - 17.7 g/dL    Hematocrit 43.4 40.0 - 53.0 %    MCV 95 78 - 100 fL    MCH 32.2 26.5 - 33.0 pg    MCHC 33.9 31.5 - 36.5 g/dL    RDW 12.8 10.0 - 15.0 %    Platelet Count 179 150 - 450 10e3/uL    % Neutrophils 59 %    % Lymphocytes 29 %    % Monocytes 9 %    % Eosinophils 3 %    % Basophils 0 %    % Immature Granulocytes 0 %    NRBCs per 100 WBC 0 <1 /100    Absolute Neutrophils 5.8 1.6 - 8.3 10e3/uL    Absolute Lymphocytes 2.8 0.8 - 5.3 10e3/uL    Absolute Monocytes 0.8 0.0 - 1.3 10e3/uL    Absolute Eosinophils 0.3 0.0 - 0.7 10e3/uL    Absolute Basophils 0.0 0.0 - 0.2 10e3/uL    Absolute Immature Granulocytes 0.0 <=0.0 10e3/uL    Absolute NRBCs 0.0 10e3/uL           Pertinent Radiology

## 2021-08-07 NOTE — PROGRESS NOTES
[facility] MEDICINE PROGRESS NOTE      Identification/Summary:     Shannan Kothari is a 55 year old male with past medical history significant for chronic back pain, on chronic opioid therapy, DM, CAD s/p cardiac stent on Plavix, psoriatic arthritis on methotrexate, C diff in 9/2020 admitted with acute on chronic abdominal pain, diarrhea, nausea. CT scan concerning for partial small bowel obstruction vs ileus.       Assessment and Plan:      Abdominal pain, acute on chronic   Diarrhea, no BM since yesterday admission  History of Cdiff in the past   CT scan concerning for partial SBO  He had extensive work up in the past for chronic abdominal pain  Currently NPO, on IVF  GI and surgery following   Will check stool for Cdiff if diarrhea recurs      DM type 2  On glipizide, on hold   Cover with SSI    CAD s/p stent   On Lipitor, ASA and Plavix     Chronic back pain  Chronic narcotic use   On Norco ordered   On MS Contin, ordered      Psoriatic arthropathy  On MTX , on hold       Diet: NPO for Medical/Clinical Reasons Except for: Ice Chips, Meds  DVT Prophylaxis: SCD, he is on aspirin and Plavix   Code Status: Full Code    Anticipated possible discharge in 1-2 days    Interval History/Subjective:  Feeling bloated  Complains of abdominal pain   No BM since yesterday afternoon     Physical Exam/Objective:  Temp:  [97.5  F (36.4  C)-98.5  F (36.9  C)] 97.5  F (36.4  C)  Pulse:  [63-84] 63  Resp:  [18] 18  BP: (113-152)/(67-85) 113/67  SpO2:  [93 %-98 %] 93 %    Body mass index is 33.38 kg/m .    Physical Exam:    General Appearance:  Alert, cooperative, no distress   Head:  Normocephalic, atraumatic   Eyes:  PERRL    Throat:  mucosa; moist   Neck: No JVD, no LAP   Lungs:   Clear to auscultation bilaterally, respirations unlabored   Chest Wall:  No tenderness or deformity   Heart:  Regular rate and rhythm, S1, S2 normal,no murmur   Abdomen:   Soft, non tender, non distended, bowel sounds present,  no guarding or rigidity   Extremities: No edema, no joint swelling   Skin: Skin color, texture, turgor normal, no rashes or lesions   Neurologic: Alert and oriented X 3, Moves all 4 extremities       Medications:   Personally Reviewed.      Medications     sodium chloride 125 mL/hr at 08/07/21 0949       aspirin  81 mg Oral Daily     atorvastatin  80 mg Oral At Bedtime     clopidogrel  75 mg Oral Daily     fenofibrate  160 mg Oral Daily     folic acid  1 mg Oral QAM AC     metoprolol tartrate  12.5 mg Oral BID     morphine  60 mg Oral Q12H JOSE     pantoprazole  40 mg Oral Daily     rosuvastatin  40 mg Oral At Bedtime       Data reviewed today: I personally reviewed all new medications, labs, imaging/diagnostics reports over the past 24 hours    Imaging:   Recent Results (from the past 24 hour(s))   CT Abdomen Pelvis w/o Contrast    Narrative    EXAM: CT ABDOMEN PELVIS W/O CONTRAST  LOCATION: [facility]  DATE/TIME: 8/6/2021 7:33 PM    INDICATION: Flank pain, kidney stone suspected  COMPARISON: None.  TECHNIQUE: CT scan of the abdomen and pelvis was performed without IV contrast. Multiplanar reformats were obtained. Dose reduction techniques were used.  CONTRAST: None.    FINDINGS:   LOWER CHEST: Coronary artery disease.     HEPATOBILIARY: Hepatic steatosis. Cholecystectomy.     PANCREAS: Normal.    SPLEEN: Normal.    ADRENAL GLANDS: Normal.    KIDNEYS/BLADDER: No renal or ureteral calculi. No hydronephrosis or hydroureter. Normal urinary bladder.     BOWEL: Normal stomach. A few loops of small bowel in the anterior abdomen are mildly distended with a maximum diameter of 3.1 m. Air-fluid levels are present. There is change in caliber in the upper pelvis just to the right of midline (axial series 3   image 153 and coronal series 5 image 55).  More distally the small bowel is normal in caliber. Normal appendix. Normal appearance of the colon with air-fluid levels. No pneumatosis.    LYMPH  NODES: Normal.    VASCULATURE: Atherosclerotic disease. No portal venous gas.    PELVIC ORGANS: Normal.    OTHER: There is a small amount of free fluid in the pelvis.    MUSCULOSKELETAL: Normal.        Impression    IMPRESSION:   1.  No renal or ureteral calculi. No hydronephrosis or hydroureter.  2.  Possible partial or mild small bowel obstruction. Adhesive disease is suspected. No pneumatosis or portal venous gas. A small amount of free fluid in the pelvis may be related. Note that paralytic ileus could have a similar appearance.  3.  Hepatic steatosis.          Labs:  Most Recent 3 CBC's:Recent Labs   Lab Test 08/07/21  0649 08/06/21 1917   WBC 8.2 9.7   HGB 13.8 14.7   MCV 96 95    179     Most Recent 3 BMP's:Recent Labs   Lab Test 08/07/21  0649 08/07/21 0628 08/06/21 1917     --  137   POTASSIUM 3.8  --  4.1   CHLORIDE 106  --  106   CO2 25  --  21*   BUN 10  --  11   CR 0.75  --  0.80   ANIONGAP 7  --  10   MODESTO 8.5  --  8.8    117 125     Most Recent 2 LFT's:Recent Labs   Lab Test 08/07/21  0649 08/06/21 1917   AST 23 29  29   ALT 25 32  32   ALKPHOS 59 60  60   BILITOTAL 1.2* 1.0  1.0       Nati Wolf MD  Hospitalist  [facility] Medicine  [facility]  Phone: #[phone]

## 2021-08-08 ENCOUNTER — APPOINTMENT (OUTPATIENT)
Dept: RADIOLOGY | Facility: CLINIC | Age: 56
DRG: 389 | End: 2021-08-08
Attending: PHYSICIAN ASSISTANT
Payer: MEDICARE

## 2021-08-08 LAB
C DIFF TOX B STL QL: POSITIVE
GLUCOSE BLDC GLUCOMTR-MCNC: 100 MG/DL (ref 70–125)
GLUCOSE BLDC GLUCOMTR-MCNC: 104 MG/DL (ref 70–125)
GLUCOSE BLDC GLUCOMTR-MCNC: 106 MG/DL (ref 70–125)
GLUCOSE BLDC GLUCOMTR-MCNC: 108 MG/DL (ref 70–125)
GLUCOSE BLDC GLUCOMTR-MCNC: 87 MG/DL (ref 70–125)
GLUCOSE BLDC GLUCOMTR-MCNC: 89 MG/DL (ref 70–125)

## 2021-08-08 PROCEDURE — 74250 X-RAY XM SM INT 1CNTRST STD: CPT

## 2021-08-08 PROCEDURE — 258N000003 HC RX IP 258 OP 636: Performed by: INTERNAL MEDICINE

## 2021-08-08 PROCEDURE — 250N000013 HC RX MED GY IP 250 OP 250 PS 637: Performed by: INTERNAL MEDICINE

## 2021-08-08 PROCEDURE — 87493 C DIFF AMPLIFIED PROBE: CPT | Performed by: EMERGENCY MEDICINE

## 2021-08-08 PROCEDURE — 120N000001 HC R&B MED SURG/OB

## 2021-08-08 PROCEDURE — 99232 SBSQ HOSP IP/OBS MODERATE 35: CPT | Performed by: INTERNAL MEDICINE

## 2021-08-08 PROCEDURE — 99231 SBSQ HOSP IP/OBS SF/LOW 25: CPT | Performed by: SPECIALIST

## 2021-08-08 PROCEDURE — 87506 IADNA-DNA/RNA PROBE TQ 6-11: CPT | Performed by: EMERGENCY MEDICINE

## 2021-08-08 RX ORDER — VANCOMYCIN HYDROCHLORIDE 125 MG/1
125 CAPSULE ORAL 4 TIMES DAILY
Status: DISCONTINUED | OUTPATIENT
Start: 2021-08-08 | End: 2021-08-10 | Stop reason: HOSPADM

## 2021-08-08 RX ADMIN — FENOFIBRATE 160 MG: 160 TABLET ORAL at 08:37

## 2021-08-08 RX ADMIN — CLOPIDOGREL BISULFATE 75 MG: 75 TABLET, FILM COATED ORAL at 08:37

## 2021-08-08 RX ADMIN — SODIUM CHLORIDE: 9 INJECTION, SOLUTION INTRAVENOUS at 19:22

## 2021-08-08 RX ADMIN — Medication 12.5 MG: at 20:59

## 2021-08-08 RX ADMIN — ASPIRIN 81 MG CHEWABLE TABLET 81 MG: 81 TABLET CHEWABLE at 08:37

## 2021-08-08 RX ADMIN — SODIUM CHLORIDE: 9 INJECTION, SOLUTION INTRAVENOUS at 00:48

## 2021-08-08 RX ADMIN — HYDROCODONE BITARTRATE AND ACETAMINOPHEN 2 TABLET: 5; 325 TABLET ORAL at 20:59

## 2021-08-08 RX ADMIN — MORPHINE SULFATE 60 MG: 30 TABLET, FILM COATED, EXTENDED RELEASE ORAL at 12:48

## 2021-08-08 RX ADMIN — FOLIC ACID 1 MG: 1 TABLET ORAL at 06:17

## 2021-08-08 RX ADMIN — MORPHINE SULFATE 60 MG: 30 TABLET, FILM COATED, EXTENDED RELEASE ORAL at 00:46

## 2021-08-08 RX ADMIN — HYDROCODONE BITARTRATE AND ACETAMINOPHEN 2 TABLET: 5; 325 TABLET ORAL at 06:26

## 2021-08-08 RX ADMIN — VANCOMYCIN HYDROCHLORIDE 125 MG: 125 CAPSULE ORAL at 22:27

## 2021-08-08 RX ADMIN — Medication 12.5 MG: at 08:37

## 2021-08-08 RX ADMIN — ATORVASTATIN CALCIUM 80 MG: 40 TABLET, FILM COATED ORAL at 20:59

## 2021-08-08 RX ADMIN — DIATRIZOATE MEGLUMINE AND DIATRIZOATE SODIUM 120 ML: 660; 100 SOLUTION ORAL; RECTAL at 10:48

## 2021-08-08 RX ADMIN — PANTOPRAZOLE SODIUM 40 MG: 20 TABLET, DELAYED RELEASE ORAL at 08:37

## 2021-08-08 RX ADMIN — ROSUVASTATIN CALCIUM 40 MG: 10 TABLET, FILM COATED ORAL at 20:59

## 2021-08-08 RX ADMIN — HYDROCODONE BITARTRATE AND ACETAMINOPHEN 2 TABLET: 5; 325 TABLET ORAL at 17:09

## 2021-08-08 NOTE — PLAN OF CARE
Problem: Adult Inpatient Plan of Care  Goal: Optimal Comfort and Wellbeing  Outcome: Improving     Patient received scheduled MS Contin for abdominal pain. States he's beginning to pass gas. In the process of gastrografin SBFT, ordered by surgery in the morning. Awaiting results for determination of next plan. Continued to be NPO. Ambulating independently in room, VSS.

## 2021-08-08 NOTE — PROGRESS NOTES
"GASTROENTEROLOGY PROGRESS NOTE     SUBJECTIVE: Brief visit, patient on his way for gastrograffin SBFT. Crampy abdominal pain persisted overnight. Feels less bloated today. Passing flatus, no BMs. No n/v. Remains NPO.      OBJECTIVE:    /80 (BP Location: Right arm)   Pulse 67   Temp 97.5  F (36.4  C) (Oral)   Resp 18   Ht 1.854 m (6' 1\")   Wt 114.8 kg (253 lb)   SpO2 95%   BMI 33.38 kg/m    Temp (24hrs), Av.6  F (36.4  C), Min:97.4  F (36.3  C), Max:97.8  F (36.6  C)    Patient Vitals for the past 72 hrs:   Weight   21 2240 114.8 kg (253 lb)   21 1539 110.7 kg (244 lb)       Intake/Output Summary (Last 24 hours) at 2021 0959  Last data filed at 2021 0839  Gross per 24 hour   Intake 2797 ml   Output 1100 ml   Net 1697 ml        PHYSICAL EXAM  Gen: alert, oriented, NAD  Abd: soft, minimally distended, +mild diffuse abd tenderness, did not check BS as patient on his way to radiology.            Additional Comments:  ROS, FH, SH: See initial GI consult for details.     I have reviewed the patient's new clinical lab results:     Recent Labs   Lab Test 21  0649 21   WBC 8.2 9.7   HGB 13.8 14.7   MCV 96 95    179     Recent Labs   Lab Test 21  0649 21   POTASSIUM 3.8 4.1   CHLORIDE 106 106   CO2 25 21*   BUN 10 11   ANIONGAP 7 10     Recent Labs   Lab Test 21  0649 21  2225 21   ALBUMIN 3.8  --  4.3  4.3   BILITOTAL 1.2*  --  1.0  1.0   ALT 25  --  32  32   AST 23  --  29  29   PROTEIN  --  10 *  --    LIPASE  --   --  18     Imagin/6 CT abd/pelvis without contrast:  IMPRESSION:   1.  No renal or ureteral calculi. No hydronephrosis or hydroureter.  2.  Possible partial or mild small bowel obstruction. Adhesive disease is suspected. No pneumatosis or portal venous gas. A small amount of free fluid in the pelvis may be related. Note that paralytic ileus could have a similar appearance.  3.  Hepatic " steatosis    Assessment/Plan:  55 year old male with past medical history significant for chronic back pain on chronic opioid therapy, type 2 DM, CAD s/p cardiac stent on Plavix, psoriatic arthritis on methotrexate, C diff 9/2020, prior cholecystectomy admitted with acute on chronic abdominal pain, diarrhea, nausea with noncontrast CT concerning for partial small bowel obstruction vs ileus.      1. Abdominal pain. pSBO vs ileus. Certainly at risk for ileus with high dose chronic opioid therapy. With diarrhea and history of C diff, this is considerd but now diarrhea has resolved and no bowel inflammation on CT. Not able to get stool sample. Has one intra-abd surgery - cholecystectomy. Electrolytes are normal. Has had extensive negative GI workup for abd pain since 2016 including EGD (most recent 2016), colonoscopy (most recent 2018), CT scans, 5HIAA urine, HIDA without improvement with antacids, dicyclomine, metronidazole. No signs of Crohn's. Surgery consulted and conservative therapy recommended.     Noting some clinical improvements today, less bloating. Vitals stable.     Outpatient opioid regimen includes MS Contin 60mg BID and Norco 325mg 3-4 times daily. HGB A1c 5 reportedly in 5 range recently.     --Small bowel follow through ordered by surgery today.   --If findings unremarkable for obstruction, could start clear liquids, ADAT to low residue.   --If diarrhea recurs, stool studies to rule out infection.   --Minimize narcotics.    --Encourage ambulation.     Discussed with Dr. Trent.     Leilani Caro, [facility] ([facility])

## 2021-08-08 NOTE — PLAN OF CARE
Problem: Adult Inpatient Plan of Care  Goal: Optimal Comfort and Wellbeing  Outcome: No Change     Problem: Pain (Intestinal Obstruction)  Goal: Acceptable Pain Control  Intervention: Monitor and Manage Pain  Recent Flowsheet Documentation  Taken 8/7/2021 1611 by Cass Sahu RN  Pain Management Interventions: medication (see MAR)       Patient is ambulating in his room. He is passing small amounts of flatus but still no bowel movements this evening. He has mild nausea but declines any intervention. He is taking meds and ice chips well. He is taking his home dose of Norco and received it x2 this evening with good relief. He complains of bloating but denies that the simethicone has helped at all. Plans to discharge back home when ready.

## 2021-08-08 NOTE — PROGRESS NOTES
General Surgery Progress Note:    Hospital Day # 1    ASSESSMENT:   1. Partial small bowel obstruction (H)        Shannan Kothari is a 55 year old male with pSBO    PLAN:   -SBFT today  -If contrast makes it to colon, can start on clears and ADAT and discharge from surgery standpoint      Ivan Gooden PA-C  Pager - [phone]  Phone - [phone]   General Surgery    SUBJECTIVE:   Shannan Kothari is doing a little better, passing some gas now, no BM, no n/v, pain on right side seems slightly less    Patient Vitals for the past 24 hrs:   BP Temp Temp src Pulse Resp SpO2   08/08/21 0834 137/80 97.5  F (36.4  C) Oral 67 18 95 %   08/08/21 0045 122/59 97.4  F (36.3  C) Oral 72 18 96 %   08/07/21 1535 117/67 97.8  F (36.6  C) Oral 58 18 95 %   08/07/21 1208 111/64 97.6  F (36.4  C) Oral 55 18 92 %       Physical Exam:  General: NAD, pleasant  CV:RRR  LUNGS:CTA bilaterally  ABD: soft, mild distention - better than yesterday, ttp along right side  EXT:no CCE     Lab Results   Component Value Date    WBC 8.2 08/07/2021    HGB 13.8 08/07/2021    HCT 40.2 08/07/2021    MCV 96 08/07/2021     08/07/2021     INR/Prothrombin Time  Recent Labs   Lab 08/07/21  0649      CO2 25   BUN 10     Lab Results   Component Value Date    ALT 25 08/07/2021    AST 23 08/07/2021    ALKPHOS 59 08/07/2021

## 2021-08-08 NOTE — PLAN OF CARE
"  Problem: Pain Acute  Goal: Acceptable Pain Control and Functional Ability  Outcome: Improving  Intervention: Develop Pain Management Plan  Recent Flowsheet Documentation  Taken 8/8/2021 0046 by Shanell Hampton RN  Pain Management Interventions: medication (see MAR)     Problem: Nausea and Vomiting (Intestinal Obstruction)  Goal: Nausea and Vomiting Relief  Outcome: Improving     Patient reports pain 7/10. Scheduled MS Contin administered with some relief noted. Patient reports \"full\" feeling in abdomen and passing some flatus. Denies nausea at this time. IV fluids infusing per orders. Vitals stable.  "

## 2021-08-08 NOTE — PROGRESS NOTES
[facility] MEDICINE PROGRESS NOTE      Identification/Summary:     Shannan Kothari is a 55 year old male with past medical history significant for chronic back pain, on chronic opioid therapy, DM, CAD s/p cardiac stent on Plavix, psoriatic arthritis on methotrexate, C diff in 9/2020 admitted with acute on chronic abdominal pain, diarrhea, nausea. CT scan concerning for partial small bowel obstruction vs ileus. He started on conservative management with NPO diet and IVF. Pain better today but he still feels bloated, report flatulence, no BM for the past 2 days       Assessment and Plan:      Abdominal pain, acute on chronic   Likely SBO  History of Cdiff in the past   CT scan concerning for partial SBO  He had extensive work up in the past for chronic abdominal pain  Currently NPO, on IVF  GI and surgery following   Going for SBFT today     History of chronic abdominal pain, needs follow up with GI outpatient       DM type 2  On glipizide, on hold   Cover with SSI    CAD s/p stent   On Lipitor, ASA and Plavix     Chronic back pain  Chronic narcotic use   On Norco ordered   On MS Contin, ordered      Psoriatic arthropathy  On MTX , on hold       Diet: NPO for Medical/Clinical Reasons Except for: Ice Chips, Meds  DVT Prophylaxis: SCD, he is on aspirin and Plavix   Code Status: Full Code    Anticipated possible discharge in 1-2 days    Interval History/Subjective:  Feeling bloated  Complains of abdominal pain   No BM since yesterday afternoon     Physical Exam/Objective:  Temp:  [97.4  F (36.3  C)-97.8  F (36.6  C)] 97.5  F (36.4  C)  Pulse:  [55-72] 67  Resp:  [18] 18  BP: (111-137)/(59-80) 137/80  SpO2:  [92 %-96 %] 95 %    Body mass index is 33.38 kg/m .    Physical Exam:    General Appearance:  Alert, cooperative, no distress   Head:  Normocephalic, atraumatic   Eyes:  PERRL    Throat:  mucosa; moist   Neck: No JVD, no LAP   Lungs:   Clear to auscultation bilaterally, respirations  unlabored   Chest Wall:  No tenderness or deformity   Heart:  Regular rate and rhythm, S1, S2 normal,no murmur   Abdomen:   Soft, non tender, slightly distended, bowel sounds present, no guarding or rigidity   Extremities: No edema, no joint swelling   Skin: Skin color, texture, turgor normal, no rashes or lesions   Neurologic: Alert and oriented X 3, Moves all 4 extremities       Medications:   Personally Reviewed.      Medications     sodium chloride 125 mL/hr at 08/08/21 0048       aspirin  81 mg Oral Daily     atorvastatin  80 mg Oral At Bedtime     clopidogrel  75 mg Oral Daily     fenofibrate  160 mg Oral Daily     folic acid  1 mg Oral QAM AC     metoprolol tartrate  12.5 mg Oral BID     morphine  60 mg Oral Q12H JOSE     pantoprazole  40 mg Oral Daily     rosuvastatin  40 mg Oral At Bedtime       Data reviewed today: I personally reviewed all new medications, labs, imaging/diagnostics reports over the past 24 hours    Imaging:   No results found for this or any previous visit (from the past 24 hour(s)).    Labs:  Most Recent 3 CBC's:  Recent Labs   Lab Test 08/07/21 0649 08/06/21 1917   WBC 8.2 9.7   HGB 13.8 14.7   MCV 96 95    179     Most Recent 3 BMP's:  Recent Labs   Lab Test 08/08/21  0618 08/08/21  0139 08/08/21 0048 08/07/21 0649 08/06/21 1917   NA  --   --   --  138 137   POTASSIUM  --   --   --  3.8 4.1   CHLORIDE  --   --   --  106 106   CO2  --   --   --  25 21*   BUN  --   --   --  10 11   CR  --   --   --  0.75 0.80   ANIONGAP  --   --   --  7 10   MODESTO  --   --   --  8.5 8.8    106 87 120 125     Most Recent 2 LFT's:  Recent Labs   Lab Test 08/07/21 0649 08/06/21 1917   AST 23 29  29   ALT 25 32  32   ALKPHOS 59 60  60   BILITOTAL 1.2* 1.0  1.0       Nati Wolf MD  [facility]  Phone: #[phone]

## 2021-08-09 LAB
C COLI+JEJUNI+LARI FUSA STL QL NAA+PROBE: NOT DETECTED
EC STX1 GENE STL QL NAA+PROBE: NOT DETECTED
EC STX2 GENE STL QL NAA+PROBE: NOT DETECTED
GLUCOSE BLDC GLUCOMTR-MCNC: 146 MG/DL (ref 70–125)
GLUCOSE BLDC GLUCOMTR-MCNC: 152 MG/DL (ref 70–125)
GLUCOSE BLDC GLUCOMTR-MCNC: 161 MG/DL (ref 70–125)
GLUCOSE BLDC GLUCOMTR-MCNC: 99 MG/DL (ref 70–125)
NOROV GI+II ORF1-ORF2 JNC STL QL NAA+PR: NOT DETECTED
RVA NSP5 STL QL NAA+PROBE: NOT DETECTED
SALMONELLA SP RPOD STL QL NAA+PROBE: NOT DETECTED
SHIGELLA SP+EIEC IPAH STL QL NAA+PROBE: NOT DETECTED
V CHOL+PARA RFBL+TRKH+TNAA STL QL NAA+PR: NOT DETECTED
Y ENTERO RECN STL QL NAA+PROBE: NOT DETECTED

## 2021-08-09 PROCEDURE — 99231 SBSQ HOSP IP/OBS SF/LOW 25: CPT | Performed by: SPECIALIST

## 2021-08-09 PROCEDURE — 258N000003 HC RX IP 258 OP 636: Performed by: INTERNAL MEDICINE

## 2021-08-09 PROCEDURE — 250N000013 HC RX MED GY IP 250 OP 250 PS 637: Performed by: INTERNAL MEDICINE

## 2021-08-09 PROCEDURE — 250N000013 HC RX MED GY IP 250 OP 250 PS 637: Performed by: NURSE PRACTITIONER

## 2021-08-09 PROCEDURE — 99207 PR CDG-CHARGE REQUIRED MANUAL ENTRY: CPT | Performed by: FAMILY MEDICINE

## 2021-08-09 PROCEDURE — 99232 SBSQ HOSP IP/OBS MODERATE 35: CPT | Performed by: FAMILY MEDICINE

## 2021-08-09 PROCEDURE — 120N000001 HC R&B MED SURG/OB

## 2021-08-09 RX ORDER — GUAR GUM
1 PACKET (EA) ORAL 2 TIMES DAILY
Status: DISCONTINUED | OUTPATIENT
Start: 2021-08-13 | End: 2021-08-10 | Stop reason: HOSPADM

## 2021-08-09 RX ORDER — POLYETHYLENE GLYCOL 3350 17 G/17G
17 POWDER, FOR SOLUTION ORAL DAILY PRN
Status: DISCONTINUED | OUTPATIENT
Start: 2021-08-09 | End: 2021-08-10

## 2021-08-09 RX ADMIN — SIMETHICONE 80 MG: 80 TABLET, CHEWABLE ORAL at 16:56

## 2021-08-09 RX ADMIN — FOLIC ACID 1 MG: 1 TABLET ORAL at 06:10

## 2021-08-09 RX ADMIN — VANCOMYCIN HYDROCHLORIDE 125 MG: 125 CAPSULE ORAL at 08:36

## 2021-08-09 RX ADMIN — Medication 12.5 MG: at 21:12

## 2021-08-09 RX ADMIN — MORPHINE SULFATE 60 MG: 30 TABLET, FILM COATED, EXTENDED RELEASE ORAL at 12:47

## 2021-08-09 RX ADMIN — HYDROCODONE BITARTRATE AND ACETAMINOPHEN 2 TABLET: 5; 325 TABLET ORAL at 16:56

## 2021-08-09 RX ADMIN — VANCOMYCIN HYDROCHLORIDE 125 MG: 125 CAPSULE ORAL at 12:47

## 2021-08-09 RX ADMIN — SODIUM CHLORIDE: 9 INJECTION, SOLUTION INTRAVENOUS at 10:42

## 2021-08-09 RX ADMIN — ROSUVASTATIN CALCIUM 40 MG: 10 TABLET, FILM COATED ORAL at 21:12

## 2021-08-09 RX ADMIN — MORPHINE SULFATE 60 MG: 30 TABLET, FILM COATED, EXTENDED RELEASE ORAL at 00:18

## 2021-08-09 RX ADMIN — Medication 12.5 MG: at 08:36

## 2021-08-09 RX ADMIN — HYDROCODONE BITARTRATE AND ACETAMINOPHEN 2 TABLET: 5; 325 TABLET ORAL at 08:36

## 2021-08-09 RX ADMIN — VANCOMYCIN HYDROCHLORIDE 125 MG: 125 CAPSULE ORAL at 21:12

## 2021-08-09 RX ADMIN — FENOFIBRATE 160 MG: 160 TABLET ORAL at 08:36

## 2021-08-09 RX ADMIN — ASPIRIN 81 MG CHEWABLE TABLET 81 MG: 81 TABLET CHEWABLE at 08:36

## 2021-08-09 RX ADMIN — CLOPIDOGREL BISULFATE 75 MG: 75 TABLET, FILM COATED ORAL at 08:36

## 2021-08-09 RX ADMIN — SODIUM CHLORIDE: 9 INJECTION, SOLUTION INTRAVENOUS at 03:31

## 2021-08-09 RX ADMIN — HYOSCYAMINE SULFATE 125 MCG: 0.12 TABLET ORAL at 16:46

## 2021-08-09 RX ADMIN — PANTOPRAZOLE SODIUM 40 MG: 20 TABLET, DELAYED RELEASE ORAL at 08:36

## 2021-08-09 RX ADMIN — HYDROCODONE BITARTRATE AND ACETAMINOPHEN 2 TABLET: 5; 325 TABLET ORAL at 21:09

## 2021-08-09 RX ADMIN — HYOSCYAMINE SULFATE 125 MCG: 0.12 TABLET ORAL at 12:47

## 2021-08-09 RX ADMIN — VANCOMYCIN HYDROCHLORIDE 125 MG: 125 CAPSULE ORAL at 16:46

## 2021-08-09 RX ADMIN — ATORVASTATIN CALCIUM 80 MG: 40 TABLET, FILM COATED ORAL at 21:11

## 2021-08-09 NOTE — PROGRESS NOTES
"GASTROENTEROLOGY PROGRESS NOTE     SUBJECTIVE: C diff positive - has been on vancomycin x 2 doses.     He has had symptoms of bowel irregularity since his cardiac stenting in 2016 where he will have to use miralax 2 to 3 times per week ( daily causes diarrhea) and colace daily. Despite this he continues to have symptoms of epigastric pressure post prandially. Cardiac etiology has been ruled out. He was diagnosed with diabetes one year ago. No peripheral neuropathy. He also was diagnosed with C diff infection one year ago, this is his second presentation. He feels the presenting pain was worse this time. He is hungry and does not want sugar free foods. His blood sugars have been good. Colonoscopy in 2018. He has had negative work up with EGD, colonoscopy and CT imaging.     OBJECTIVE:   BP (!) 161/81 (BP Location: Right arm)   Pulse 59   Temp 97.7  F (36.5  C) (Oral)   Resp 18   Ht 1.854 m (6' 1\")   Wt 114.8 kg (253 lb)   SpO2 100%   BMI 33.38 kg/m     Temp (24hrs), Av.8  F (36.6  C), Min:97.3  F (36.3  C), Max:98.3  F (36.8  C)     Patient Vitals for the past 72 hrs:   Weight   21 2240 114.8 kg (253 lb)   21 1539 110.7 kg (244 lb)        Intake/Output Summary (Last 24 hours) at 2021 1049  Last data filed at 2021 0600  Gross per 24 hour   Intake 2211 ml   Output 200 ml   Net 2011 ml      PHYSICAL EXAM   Constitutional: Healthy, in bed, no acute distress, obese  Cardiovascular: Regular rate and rhythm.   Respiratory:  respirations non labored.   Abdomen: Soft, non-tender, non-distended, normally active bowel sounds. No masses or hepatosplenomegaly appreciated. No guarding or rebound tenderness.    Additional Comments:   ROS, FH, SH: See initial GI consult for details.     I have reviewed the patient's new clinical lab results:   Recent Labs   Lab Test 21  1917   WBC 8.2 9.7   HGB 13.8 14.7   MCV 96 95    179      Recent Labs   Lab Test 21  0649 " 08/06/21  1917    137   POTASSIUM 3.8 4.1   CHLORIDE 106 106   CO2 25 21*   BUN 10 11   CR 0.75 0.80   ANIONGAP 7 10   MODESTO 8.5 8.8      Recent Labs   Lab Test 08/07/21  0649 08/06/21  2225 08/06/21 1917   ALBUMIN 3.8  --  4.3  4.3   BILITOTAL 1.2*  --  1.0  1.0   ALT 25  --  32  32   AST 23  --  29  29   ALKPHOS 59  --  60  60   PROTEIN  --  10 *  --    LIPASE  --   --  18      Assessment: 55 year old male with past medical history significant for chronic back pain on chronic opioid therapy, type 2 DM, CAD s/p cardiac stent on Plavix, psoriatic arthritis on methotrexate, C diff in 9/2020, prior cholecystectomy admitted with acute on chronic abdominal pain, diarrhea, nausea with CT concerning for partial small bowel obstruction vs ileus. SBFT with contrast passing, now with diarrhea with stool testing positive for C diff.     1. Abdominal pain, has been present since cardiac stenting in 2016, on chronic opioids. Reporting post prandial symptoms of epigastric distress. Differentials include IBS-C. I think less likely gastroparesis without evidence of  Peripheral neuropathy. Will start hyoscyamine and fiber. He will continue miralax ( daily dosing causes diarrhea ) and have him follow up in our [facility] the [facility] ( motility clinic) vessel disease has been ruled out.     2. C diff infection - second episode. Interestingly presents with constipation. Will treat with vancomycin.     3. Constipation exacerbated by opioid use.     Plan:  Advance diet as tolerated.   Complete vanco  Fiber supplement to start in 5 days.   Continue miralax as needed ( may need alternative stimulant. )) ( amitiza or movantik)   Continue colace  Hyoscyamine three times daily before meals.     GI will sign off, please call with questions or concerns.        Juli Beasley [facility]   Office

## 2021-08-09 NOTE — PROGRESS NOTES
Report given to KARL Morgan.  Patient to be transported to [address] [place] [address] via wheelchair with IV saline locked.  Belongings sent with.

## 2021-08-09 NOTE — PLAN OF CARE
Problem: Adult Inpatient Plan of Care  Goal: Optimal Comfort and Wellbeing  Outcome: Improving   Pt. Will rest tonight with stated comfort < 3 from medications.

## 2021-08-09 NOTE — PROGRESS NOTES
Txt paged Dr. Jimenes 4:38 PM 08/09/21     Tu RN #[id number]  358: PANCHITO  Tolerating oral intake. Can we saline lock? Also, any plan to discharge home today?     --------------  Pending response   ** ADDENDUM 5:07 PM 08/09/21 - IV Fluids discontinued per Dr. Jimenes <-- Dr. Jimenes repaged and notified that patient reporting bloating/constipation and mild nausea (declines intervention) post-meal but tolerating and will continue to take oral liquids.

## 2021-08-09 NOTE — PLAN OF CARE
Problem: Pain Acute  Goal: Acceptable Pain Control and Functional Ability  Outcome: Improving    Managing generalized cramping abdominal pain with MS Contin and Norco.  Some relief noted. Encouraging activity/repositioning.      Problem: Infection (Intestinal Obstruction)  Goal: Absence of Infection Signs and Symptoms  Outcome: Improving   CDIFF+ and reports feeling bloated/constipated. Passing flatus. Encouraging activity - tolerating regular diet (low fiber). Will monitor.

## 2021-08-09 NOTE — PLAN OF CARE
Problem: Pain (Intestinal Obstruction)  Goal: Acceptable Pain Control  Outcome: Improving  Intervention: Monitor and Manage Pain  Recent Flowsheet Documentation  Taken 8/8/2021 2059 by Cass Sahu, RN  Pain Management Interventions: medication (see MAR)  Taken 8/8/2021 1709 by Cass Sahu, RN  Pain Management Interventions: medication (see MAR)  Taken 8/8/2021 1550 by Cass Sahu, RN  Pain Management Interventions: declines    Patient continues to have abdominal discomfort and was given 2 doses of PRN Norco. He was advanced to Clear Liquids and is tolerating it ok. Taking PO Vanco for c-diff.

## 2021-08-09 NOTE — PROVIDER NOTIFICATION
Text paged Dr. Kohli at 2015 with positive c-diff results. Awaiting new orders.     Vancomycin PO ordered.

## 2021-08-09 NOTE — PROGRESS NOTES
General Surgery Progress Note:    Hospital Day # 2    ASSESSMENT:   1. Partial small bowel obstruction (H)    C. difficile infection    Shannan Kothari is a 55 year old male abdominal pain and diarrhea, history of C. difficile    PLAN:   Small bowel follow-through yesterday goes through he has had loose stools and continues to do so.  He is positive for C. Difficile.  I do not think he is a surgical at this time point he is improving from that standpoint having loose stools and regular basis and I think this is more likely C. difficile kind of issue.    Will be available for any questions or concerns or come up        SUBJECTIVE:   Shannan Kothari is improving better pain and symptoms but still has count of abdominal pain every day when he is a bowel movement this can goes away    Patient Vitals for the past 24 hrs:   BP Temp Temp src Pulse Resp SpO2   08/09/21 0833 (!) 161/81 97.7  F (36.5  C) Oral 59 18 100 %   08/09/21 0430 (!) 148/78 98.3  F (36.8  C) Oral 60 18 94 %   08/09/21 0030 (!) 147/74 97.8  F (36.6  C) Oral 66 18 99 %   08/08/21 2032 133/67 97.4  F (36.3  C) Oral 70 16 97 %   08/08/21 1550 139/78 98.1  F (36.7  C) Oral 70 16 99 %   08/08/21 1228 119/63 97.3  F (36.3  C) Oral 67 18 96 %       Physical Exam:  General: NAD, pleasant  CV:RRR  LUNGS:CTA bilaterally  ABD: soft distended, no rebound or guarding  EXT:no CCE    Admission on 08/06/2021   Component Date Value     Lipase 08/06/2021 18      Sodium 08/06/2021 137      Potassium 08/06/2021 4.1      Chloride 08/06/2021 106      Carbon Dioxide (CO2) 08/06/2021 21*     Anion Gap 08/06/2021 10      Urea Nitrogen 08/06/2021 11      Creatinine 08/06/2021 0.80      Calcium 08/06/2021 8.8      Glucose 08/06/2021 125      Alkaline Phosphatase 08/06/2021 60      AST 08/06/2021 29      ALT 08/06/2021 32      Protein Total 08/06/2021 7.5      Albumin 08/06/2021 4.3      Bilirubin Total 08/06/2021 1.0      GFR Estimate 08/06/2021 >90      Color Urine 08/06/2021  Yellow      Appearance Urine 08/06/2021 Clear      Glucose Urine 08/06/2021 Negative      Bilirubin Urine 08/06/2021 Negative      Ketones Urine 08/06/2021 Trace*     Specific Gravity Urine 08/06/2021 1.030      Blood Urine 08/06/2021 Negative      pH Urine 08/06/2021 5.5      Protein Albumin Urine 08/06/2021 10 *     Urobilinogen Urine 08/06/2021 2.0*     Nitrite Urine 08/06/2021 Negative      Leukocyte Esterase Urine 08/06/2021 Negative      Mucus Urine 08/06/2021 Present*     RBC Urine 08/06/2021 1      WBC Urine 08/06/2021 1      Squamous Epithelials Uri* 08/06/2021 <1      Bilirubin Total 08/06/2021 1.0      Bilirubin Direct 08/06/2021 0.3      Protein Total 08/06/2021 7.5      Albumin 08/06/2021 4.3      Alkaline Phosphatase 08/06/2021 60      AST 08/06/2021 29      ALT 08/06/2021 32      C Difficile Toxin B by P* 08/08/2021 Positive*     Campylobacter group 08/08/2021 Not Detected      Salmonella species 08/08/2021 Not Detected      Shigella species 08/08/2021 Not Detected      Vibrio group 08/08/2021 Not Detected      Rotavirus 08/08/2021 Not Detected      Shiga toxin 1 gene 08/08/2021 Not Detected      Shiga toxin 2 gene 08/08/2021 Not Detected      Norovirus I and II 08/08/2021 Not Detected      Yersinia enterocolitica 08/08/2021 Not Detected      WBC Count 08/06/2021 9.7      RBC Count 08/06/2021 4.56      Hemoglobin 08/06/2021 14.7      Hematocrit 08/06/2021 43.4      MCV 08/06/2021 95      MCH 08/06/2021 32.2      MCHC 08/06/2021 33.9      RDW 08/06/2021 12.8      Platelet Count 08/06/2021 179      % Neutrophils 08/06/2021 59      % Lymphocytes 08/06/2021 29      % Monocytes 08/06/2021 9      % Eosinophils 08/06/2021 3      % Basophils 08/06/2021 0      % Immature Granulocytes 08/06/2021 0      NRBCs per 100 WBC 08/06/2021 0      Absolute Neutrophils 08/06/2021 5.8      Absolute Lymphocytes 08/06/2021 2.8      Absolute Monocytes 08/06/2021 0.8      Absolute Eosinophils 08/06/2021 0.3      Absolute  Basophils 08/06/2021 0.0      Absolute Immature Granul* 08/06/2021 0.0      Absolute NRBCs 08/06/2021 0.0      SARS CoV2 PCR 08/06/2021 Negative      Sodium 08/07/2021 138      Potassium 08/07/2021 3.8      Chloride 08/07/2021 106      Carbon Dioxide (CO2) 08/07/2021 25      Anion Gap 08/07/2021 7      Urea Nitrogen 08/07/2021 10      Creatinine 08/07/2021 0.75      Calcium 08/07/2021 8.5      Glucose 08/07/2021 120      Alkaline Phosphatase 08/07/2021 59      AST 08/07/2021 23      ALT 08/07/2021 25      Protein Total 08/07/2021 6.7      Albumin 08/07/2021 3.8      Bilirubin Total 08/07/2021 1.2*     GFR Estimate 08/07/2021 >90      Magnesium 08/06/2021 1.8      GLUCOSE BY METER POCT 08/07/2021 117      WBC Count 08/07/2021 8.2      RBC Count 08/07/2021 4.18*     Hemoglobin 08/07/2021 13.8      Hematocrit 08/07/2021 40.2      MCV 08/07/2021 96      MCH 08/07/2021 33.0      MCHC 08/07/2021 34.3      RDW 08/07/2021 12.8      Platelet Count 08/07/2021 193      % Neutrophils 08/07/2021 59      % Lymphocytes 08/07/2021 29      % Monocytes 08/07/2021 8      % Eosinophils 08/07/2021 4      % Basophils 08/07/2021 0      % Immature Granulocytes 08/07/2021 0      NRBCs per 100 WBC 08/07/2021 0      Absolute Neutrophils 08/07/2021 4.8      Absolute Lymphocytes 08/07/2021 2.3      Absolute Monocytes 08/07/2021 0.7      Absolute Eosinophils 08/07/2021 0.3      Absolute Basophils 08/07/2021 0.0      Absolute Immature Granul* 08/07/2021 0.0      Absolute NRBCs 08/07/2021 0.0      GLUCOSE BY METER POCT 08/07/2021 104      GLUCOSE BY METER POCT 08/07/2021 115      GLUCOSE BY METER POCT 08/07/2021 93      GLUCOSE BY METER POCT 08/08/2021 87      GLUCOSE BY METER POCT 08/08/2021 106      GLUCOSE BY METER POCT 08/08/2021 100      GLUCOSE BY METER POCT 08/08/2021 104      GLUCOSE BY METER POCT 08/08/2021 108      GLUCOSE BY METER POCT 08/08/2021 89      GLUCOSE BY METER POCT 08/09/2021 99         Study Result    Narrative & Impression    EXAM: XR GASTROGRAFIN SM BOWEL FOLLOW THROUGH  LOCATION: [facility]  DATE/TIME: 8/8/2021 10:36 AM     INDICATION: pSBO, no NG, give contrast PO  COMPARISON: CT 08/06/2021.  TECHNIQUE: Routine water-soluble contrast small bowel follow-through.     FINDINGS:   FLUOROSCOPIC TIME: 0 minutes  NUMBER OF IMAGES: 9 radiographs      A few loops of small bowel are distended to 45 mm mm in diameter. Contrast material extends through the small bowel and colon and into the rectum by the 5 hour 15 minute time point. No intraperitoneal free air identified. Cholecystectomy clips are   present.                                                                      IMPRESSION:  1.  Contrast material traverses the entire bowel by the 5 hour 15 minute time point.  2.  Distended small bowel loops are present.  3.  Findings could represent a partial small bowel obstruction.          Arnaldo Rankin MD

## 2021-08-10 VITALS
RESPIRATION RATE: 17 BRPM | HEART RATE: 57 BPM | HEIGHT: 73 IN | BODY MASS INDEX: 33.53 KG/M2 | TEMPERATURE: 97.9 F | OXYGEN SATURATION: 96 % | WEIGHT: 253 LBS | DIASTOLIC BLOOD PRESSURE: 71 MMHG | SYSTOLIC BLOOD PRESSURE: 124 MMHG

## 2021-08-10 LAB
ALBUMIN SERPL-MCNC: 3.7 G/DL (ref 3.5–5)
ALP SERPL-CCNC: 55 U/L (ref 45–120)
ALT SERPL W P-5'-P-CCNC: 24 U/L (ref 0–45)
ANION GAP SERPL CALCULATED.3IONS-SCNC: 8 MMOL/L (ref 5–18)
AST SERPL W P-5'-P-CCNC: 28 U/L (ref 0–40)
BASOPHILS # BLD AUTO: 0 10E3/UL (ref 0–0.2)
BASOPHILS NFR BLD AUTO: 1 %
BILIRUB SERPL-MCNC: 0.5 MG/DL (ref 0–1)
BUN SERPL-MCNC: 6 MG/DL (ref 8–22)
CALCIUM SERPL-MCNC: 8.7 MG/DL (ref 8.5–10.5)
CHLORIDE BLD-SCNC: 111 MMOL/L (ref 98–107)
CO2 SERPL-SCNC: 25 MMOL/L (ref 22–31)
CREAT SERPL-MCNC: 0.7 MG/DL (ref 0.7–1.3)
EOSINOPHIL # BLD AUTO: 0.3 10E3/UL (ref 0–0.7)
EOSINOPHIL NFR BLD AUTO: 4 %
ERYTHROCYTE [DISTWIDTH] IN BLOOD BY AUTOMATED COUNT: 12.8 % (ref 10–15)
GFR SERPL CREATININE-BSD FRML MDRD: >90 ML/MIN/1.73M2
GLUCOSE BLD-MCNC: 138 MG/DL (ref 70–125)
GLUCOSE BLDC GLUCOMTR-MCNC: 145 MG/DL (ref 70–125)
GLUCOSE BLDC GLUCOMTR-MCNC: 178 MG/DL (ref 70–125)
HCT VFR BLD AUTO: 40.2 % (ref 40–53)
HGB BLD-MCNC: 13.8 G/DL (ref 13.3–17.7)
HOLD SPECIMEN: NORMAL
IMM GRANULOCYTES # BLD: 0 10E3/UL
IMM GRANULOCYTES NFR BLD: 0 %
LYMPHOCYTES # BLD AUTO: 2.6 10E3/UL (ref 0.8–5.3)
LYMPHOCYTES NFR BLD AUTO: 40 %
MAGNESIUM SERPL-MCNC: 1.8 MG/DL (ref 1.8–2.6)
MCH RBC QN AUTO: 32.5 PG (ref 26.5–33)
MCHC RBC AUTO-ENTMCNC: 34.3 G/DL (ref 31.5–36.5)
MCV RBC AUTO: 95 FL (ref 78–100)
MONOCYTES # BLD AUTO: 0.6 10E3/UL (ref 0–1.3)
MONOCYTES NFR BLD AUTO: 10 %
NEUTROPHILS # BLD AUTO: 3 10E3/UL (ref 1.6–8.3)
NEUTROPHILS NFR BLD AUTO: 45 %
NRBC # BLD AUTO: 0 10E3/UL
NRBC BLD AUTO-RTO: 0 /100
PLATELET # BLD AUTO: 216 10E3/UL (ref 150–450)
POTASSIUM BLD-SCNC: 3.8 MMOL/L (ref 3.5–5)
PROT SERPL-MCNC: 6.5 G/DL (ref 6–8)
RBC # BLD AUTO: 4.24 10E6/UL (ref 4.4–5.9)
SODIUM SERPL-SCNC: 144 MMOL/L (ref 136–145)
WBC # BLD AUTO: 6.5 10E3/UL (ref 4–11)

## 2021-08-10 PROCEDURE — 99207 PR CDG-CHARGE REQUIRED MANUAL ENTRY: CPT | Performed by: FAMILY MEDICINE

## 2021-08-10 PROCEDURE — 250N000013 HC RX MED GY IP 250 OP 250 PS 637: Performed by: FAMILY MEDICINE

## 2021-08-10 PROCEDURE — 85025 COMPLETE CBC W/AUTO DIFF WBC: CPT | Performed by: FAMILY MEDICINE

## 2021-08-10 PROCEDURE — 83735 ASSAY OF MAGNESIUM: CPT | Performed by: FAMILY MEDICINE

## 2021-08-10 PROCEDURE — 36415 COLL VENOUS BLD VENIPUNCTURE: CPT | Performed by: FAMILY MEDICINE

## 2021-08-10 PROCEDURE — 82040 ASSAY OF SERUM ALBUMIN: CPT | Performed by: FAMILY MEDICINE

## 2021-08-10 PROCEDURE — 99239 HOSP IP/OBS DSCHRG MGMT >30: CPT | Performed by: FAMILY MEDICINE

## 2021-08-10 PROCEDURE — 250N000013 HC RX MED GY IP 250 OP 250 PS 637: Performed by: INTERNAL MEDICINE

## 2021-08-10 PROCEDURE — 250N000013 HC RX MED GY IP 250 OP 250 PS 637: Performed by: NURSE PRACTITIONER

## 2021-08-10 RX ORDER — POLYETHYLENE GLYCOL 3350 17 G/17G
17 POWDER, FOR SOLUTION ORAL 2 TIMES DAILY
Status: DISCONTINUED | OUTPATIENT
Start: 2021-08-10 | End: 2021-08-10 | Stop reason: HOSPADM

## 2021-08-10 RX ORDER — VANCOMYCIN HYDROCHLORIDE 125 MG/1
125 CAPSULE ORAL 4 TIMES DAILY
Qty: 48 CAPSULE | Refills: 0 | Status: SHIPPED | OUTPATIENT
Start: 2021-08-10 | End: 2021-08-22

## 2021-08-10 RX ORDER — ACETAMINOPHEN 325 MG/1
650 TABLET ORAL EVERY 6 HOURS PRN
Start: 2021-08-10

## 2021-08-10 RX ORDER — GUAR GUM
1 PACKET (EA) ORAL 2 TIMES DAILY
Qty: 75 PACKET | Refills: 1 | Status: SHIPPED | OUTPATIENT
Start: 2021-08-13

## 2021-08-10 RX ADMIN — PANTOPRAZOLE SODIUM 40 MG: 20 TABLET, DELAYED RELEASE ORAL at 09:53

## 2021-08-10 RX ADMIN — MORPHINE SULFATE 60 MG: 30 TABLET, FILM COATED, EXTENDED RELEASE ORAL at 01:11

## 2021-08-10 RX ADMIN — VANCOMYCIN HYDROCHLORIDE 125 MG: 125 CAPSULE ORAL at 08:05

## 2021-08-10 RX ADMIN — Medication 12.5 MG: at 08:05

## 2021-08-10 RX ADMIN — HYDROCODONE BITARTRATE AND ACETAMINOPHEN 2 TABLET: 5; 325 TABLET ORAL at 05:14

## 2021-08-10 RX ADMIN — FOLIC ACID 1 MG: 1 TABLET ORAL at 06:41

## 2021-08-10 RX ADMIN — POLYETHYLENE GLYCOL 3350 17 G: 17 POWDER, FOR SOLUTION ORAL at 11:59

## 2021-08-10 RX ADMIN — CLOPIDOGREL BISULFATE 75 MG: 75 TABLET, FILM COATED ORAL at 08:05

## 2021-08-10 RX ADMIN — HYOSCYAMINE SULFATE 125 MCG: 0.12 TABLET ORAL at 11:59

## 2021-08-10 RX ADMIN — HYOSCYAMINE SULFATE 125 MCG: 0.12 TABLET ORAL at 06:41

## 2021-08-10 RX ADMIN — ASPIRIN 81 MG CHEWABLE TABLET 81 MG: 81 TABLET CHEWABLE at 08:05

## 2021-08-10 RX ADMIN — HYDROCODONE BITARTRATE AND ACETAMINOPHEN 2 TABLET: 5; 325 TABLET ORAL at 01:11

## 2021-08-10 RX ADMIN — FENOFIBRATE 160 MG: 160 TABLET ORAL at 09:53

## 2021-08-10 RX ADMIN — HYDROCODONE BITARTRATE AND ACETAMINOPHEN 2 TABLET: 5; 325 TABLET ORAL at 09:53

## 2021-08-10 NOTE — PROGRESS NOTES
[facility] MEDICINE PROGRESS NOTE      Identification/Summary:     Shannan Kothari is a 55 year old male with past medical history significant for chronic back pain, on chronic opioid therapy, DM, CAD s/p cardiac stent on Plavix, psoriatic arthritis on methotrexate, C diff in 9/2020 admitted with acute on chronic abdominal pain, diarrhea, nausea. CT scan concerning for partial small bowel obstruction vs ileus. He started on conservative management with NPO diet and IVF.  Pt tested pos for c diff  Overall doing better     Assessment and Plan:      Abdominal pain, acute on chronic   partial SBO   resolving  CT scan concerning for partial SBO, SBFT; contrast through th entire bowel by the 5 hr 15 min  He had extensive work up in the past for chronic abdominal pain  Today will ADAT  GI and surgery following   chronic abdominal pain, needs follow up with GI outpatient   On schedule PPI and MS contin     C diff  -on Vanco     DM type 2  On glipizide, on hold   Cover with SSI    CAD s/p stent   On Lipitor, ASA and Plavix     HTN  -cont Metoprolol    Chronic back pain  Chronic narcotic use   On Norco ordered   On MS Contin, ordered      Psoriatic arthropathy  On MTX , on hold       Diet: Low Fiber Diet  DVT Prophylaxis: SCD, he is on aspirin and Plavix   Code Status: Full Code    Anticipated possible discharge in 1-2 days once tolerate reg     Interval History/Subjective:  Report multiple loose stool. Concern about recurrent c diff infection and worry that his grandkid may get it.  Would like to advance diet  afebrile    Physical Exam/Objective:  Temp:  [97.5  F (36.4  C)-98.3  F (36.8  C)] 97.5  F (36.4  C)  Pulse:  [53-66] 61  Resp:  [18] 18  BP: (131-161)/(69-81) 134/69  SpO2:  [94 %-100 %] 98 %    Body mass index is 33.38 kg/m .    Physical Exam:    General Appearance:  Alert, cooperative, no distress   Head:  Normocephalic, atraumatic   Eyes:  PERRL    Throat:  mucosa; moist   Neck: No JVD,  no LAP   Lungs:   Clear to auscultation bilaterally, respirations unlabored   Chest Wall:  No tenderness or deformity   Heart:  Regular rate and rhythm, S1, S2 normal,no murmur   Abdomen:   Soft, non tender, slightly distended, bowel sounds present, no guarding or rigidity   Extremities: No edema, no joint swelling   Skin: Skin color, texture, turgor normal, no rashes or lesions   Neurologic: Alert and oriented X 3, Moves all 4 extremities       Medications:   Personally Reviewed.      Medications       aspirin  81 mg Oral Daily     atorvastatin  80 mg Oral At Bedtime     clopidogrel  75 mg Oral Daily     fenofibrate  160 mg Oral Daily     [START ON 8/13/2021] fiber modular (NUTRISOURCE FIBER)  1 packet Oral BID     folic acid  1 mg Oral QAM AC     hyoscyamine  125 mcg Sublingual TID AC     metoprolol tartrate  12.5 mg Oral BID     morphine  60 mg Oral Q12H JOSE     pantoprazole  40 mg Oral Daily     rosuvastatin  40 mg Oral At Bedtime     vancomycin  125 mg Oral 4x Daily       Data reviewed today: I personally reviewed all new medications, labs, imaging/diagnostics reports over the past 24 hours    Imaging:   No results found for this or any previous visit (from the past 24 hour(s)).    Labs:  Most Recent 3 CBC's:  Recent Labs   Lab Test 08/07/21  0649 08/06/21 1917   WBC 8.2 9.7   HGB 13.8 14.7   MCV 96 95    179     Most Recent 3 BMP's:  Recent Labs   Lab Test 08/09/21  2116 08/09/21  1655 08/09/21  1142 08/07/21  0649 08/06/21 1917   NA  --   --   --  138 137   POTASSIUM  --   --   --  3.8 4.1   CHLORIDE  --   --   --  106 106   CO2  --   --   --  25 21*   BUN  --   --   --  10 11   CR  --   --   --  0.75 0.80   ANIONGAP  --   --   --  7 10   MODESTO  --   --   --  8.5 8.8   * 146* 152* 120 125     Most Recent 2 LFT's:  Recent Labs   Lab Test 08/07/21  0649 08/06/21 1917   AST 23 29  29   ALT 25 32  32   ALKPHOS 59 60  60   BILITOTAL 1.2* 1.0  1.0

## 2021-08-10 NOTE — PROGRESS NOTES
Care Management Discharge Note    Discharge Date: 08/10/2021       Discharge Disposition: Home    Discharge Services: None    Discharge DME: None    Discharge Transportation: family or friend will provide    Private pay costs discussed: Not applicable    PAS Confirmation Code:  NA  Patient/family educated on Medicare website which has current facility and service quality ratings:  (NA)    Education Provided on the Discharge Plan:  Yes  Persons Notified of Discharge Plans: Pt  Patient/Family in Agreement with the Plan: yes    Handoff Referral Completed:NA    Additional Information:    Pt discharging home without any services.  Pt has family who can provide support upon discharge as needed.  Family to transport.     ENRIKE Amato

## 2021-08-10 NOTE — PLAN OF CARE
Problem: Adult Inpatient Plan of Care  Goal: Optimal Comfort and Wellbeing  Outcome: Improving     Pt states only one loose BM this night shift, shows improvement. Pain managed by scheduled MS Contin and PRN Norco. Checking blood glucose ac and hs. Tolerating diet well thus far.

## 2021-08-23 NOTE — DISCHARGE SUMMARY
[facility] MEDICINE  DISCHARGE SUMMARY     Primary Care Physician: Kristy Agustin  Admission Date: 8/6/2021   Discharge Provider: Gurdeep Harper MD Discharge Date: 8/23/2021   Diet:   Active Diet and Nourishment Order   Procedures     Diet       Code Status: Prior   Activity: DCACTIVITY: Activity as tolerated        Condition at Discharge: Good     REASON FOR PRESENTATION(See Admission Note for Details)     Partial small bowel obstruction    PRINCIPAL & ACTIVE DISCHARGE DIAGNOSES      Abdominal pain, acute on chronic   partial SBO   Resolved  CT scan concerning for partial SBO, SBFT; contrast through the entire bowel by the 5 hr 15 min  He had extensive work up in the past for chronic abdominal pain  Tolerating advance diet since yesterday  GI and surgery have been following   Chronic abdominal pain, needs follow up with GI outpatient   On schedule PPI and MS contin      C diff with diarrhea that is now improved to the past 24 hours  -on oral vancomycin  He was treated for C. difficile in the past with a prolonged course of vancomycin with a taper  Plan: Complete 14-day course of vancomycin.  He will follow up with his primary care doctor in about a week and they can decide on future treatment including more prolonged course and even discussed fecal transplant.      DM type 2  On glipizide, which has been held here.  Plan: Resume prior regimen     CAD s/p stent   On Lipitor, ASA and Plavix      HTN  -Continue metoprolol     Chronic back pain  Chronic narcotic use   On hydrocodone-acetaminophen and MS Contin chronically ordered      Psoriatic arthropathy  On MTX , on hold during hospital stay and he will skip the next dose and then can discuss with primary care at follow-up.  He is not having any flare of his arthropathy at this point        Diet: Low Fiber Diet  DVT Prophylaxis: SCD, he is on aspirin and Plavix   Code Status: Full Code     Disposition: Discharged home  today       PENDING LABS     Unresulted Labs Ordered in the Past 30 Days of this Admission     No orders found from 7/7/2021 to 8/7/2021.            PROCEDURES ( this hospitalization only)      None    RECOMMENDATIONS TO OUTPATIENT PROVIDER FOR F/U VISIT     Follow-up Appointments     Follow-up and recommended labs and tests       Follow up with primary care provider, Kristy Agustin, within 7 days to   evaluate medication change and for hospital follow- up.  No follow up labs   or test are needed.             DISPOSITION     Home    SUMMARY OF HOSPITAL COURSE:      Shannan Kothari is a 55 year old male with past medical history significant for chronic back pain, on chronic opioid therapy, DM, CAD s/p cardiac stent on Plavix, psoriatic arthritis on methotrexate, C diff in 9/2020 admitted with acute on chronic abdominal pain, diarrhea, nausea. CT scan concerning for partial small bowel obstruction vs ileus. He started on conservative management with NPO diet and IVF.  He was followed by gastroenterology and general surgery.  His symptoms seem to improve and that he tolerated clear liquids and advancing diet.  Pt tested pos for c diff on 8/8.  Past history of C. difficile.  The day of discharge she reports she has had markedly decreased diarrhea x24 hours.  He is ready for discharge.        Discharge Medications with Med changes:     Discharge Medication List as of 8/10/2021  1:07 PM      START taking these medications    Details   acetaminophen (TYLENOL) 325 MG tablet Take 2 tablets (650 mg) by mouth every 6 hours as needed for mild pain or other (and adjunct with moderate or severe pain or per patient request), No Print Out      Guar Gum (FIBER MODULAR, NUTRISOURCE FIBER,) packet Take 1 packet by mouth 2 times daily, Disp-75 packet, R-1, E-Prescribe      hyoscyamine (LEVSIN/SL) 0.125 MG sublingual tablet Place 1 tablet (125 mcg) under the tongue 3 times daily (before meals), Disp-90 tablet, R-0, E-Prescribe       vancomycin (VANCOCIN) 125 MG capsule Take 1 capsule (125 mg) by mouth 4 times daily for 12 days Pharmacist can substitute other formulations.  For C. difficile, Disp-48 capsule, R-0, E-Prescribe         CONTINUE these medications which have CHANGED    Details   methotrexate 2.5 MG tablet Take 6 tablets (15 mg) by mouth once a week Skipped this coming Friday and then resume, No Print Out         CONTINUE these medications which have NOT CHANGED    Details   aspirin 81 mg chewable tablet [ASPIRIN 81 MG CHEWABLE TABLET] Chew 81 mg daily., Historical      atorvastatin (LIPITOR) 80 MG tablet [ATORVASTATIN (LIPITOR) 80 MG TABLET] Take 80 mg by mouth bedtime., Historical      clopidogrel (PLAVIX) 75 mg tablet [CLOPIDOGREL (PLAVIX) 75 MG TABLET] Take 75 mg by mouth daily., Historical      cyclobenzaprine (FLEXERIL) 10 MG tablet Take 10 mg by mouth at bedtime as needed, may repeat once for muscle spasms, Historical      fenofibrate (TRIGLIDE/LOFIBRA) 160 MG tablet Take 160 mg by mouth daily , Historical      folic acid (FOLVITE) 1 MG tablet Take 1 mg by mouth daily before breakfast, Historical      glipiZIDE (GLUCOTROL XL) 5 MG 24 hr tablet Take 1 tablet by mouth every 24 hours, Historical      HYDROcodone-acetaminophen 5-325 mg per tablet [HYDROCODONE-ACETAMINOPHEN 5-325 MG PER TABLET] Take 1-2 tablets by mouth every 4 (four) hours as needed for pain. , Historical      Lidocaine (LIDOCARE) 4 % Patch Place 1 patch onto the skin daily as neededHistorical      metoprolol tartrate (LOPRESSOR) 25 MG tablet [METOPROLOL TARTRATE (LOPRESSOR) 25 MG TABLET] Take 12.5 mg by mouth 2 (two) times a day., Historical      morphine (MS CONTIN) 60 MG 12 hr tablet [MORPHINE (MS CONTIN) 60 MG 12 HR TABLET] Take 60 mg by mouth 2 (two) times a day., Historical      naloxone (NARCAN) 4 MG/0.1ML nasal spray Spray 0.1 mLs in nostril as needed spray 0.1 milliliter by intranasal route in 1 nostril may repeat dose every 2-3 minutes as needed  alternating nostrils with each dose for emergency use, Historical      nitroglycerin (NITROSTAT) 0.4 MG SL tablet [NITROGLYCERIN (NITROSTAT) 0.4 MG SL TABLET] Place 0.4 mg under the tongue every 5 (five) minutes as needed for chest pain., Historical      OMEGA-3/DHA/EPA/FISH OIL (FISH OIL-OMEGA-3 FATTY ACIDS) 300-1,000 mg capsule [OMEGA-3/DHA/EPA/FISH OIL (FISH OIL-OMEGA-3 FATTY ACIDS) 300-1,000 MG CAPSULE] Take 1 g by mouth 2 (two) times a day. , Historical      pantoprazole (PROTONIX) 40 MG tablet Take 40 mg by mouth daily , Historical      polyethylene glycol (MIRALAX) 17 GM/Dose powder Take 17 g by mouth as needed, Historical      rosuvastatin (CRESTOR) 40 MG tablet Take 40 mg by mouth At Bedtime, Historical      triamcinolone (KENALOG) 0.1 % external lotion Apply 1 Application topically 2 times daily as needed Apply  topically to affected area(s) 2 times daily prn For faceHistorical      augmented betamethasone dipropionate (DIPROLENE) 0.05 % external lotion Apply topically 2 times daily as needed Apply to scalp as needHistorical      sertraline (ZOLOFT) 50 MG tablet [SERTRALINE (ZOLOFT) 50 MG TABLET] Take 50 mg by mouth daily., Historical                   Rationale for medication changes:      Oral vancomycin for new diagnosis of C. Difficile    Hyoscyamine started by gastroenterology    Acetaminophen as needed for pain        Consults     SOCIAL WORK IP CONSULT  PHARMACY IP CONSULT  GASTROENTEROLOGY IP CONSULT  SURGERY GENERAL IP CONSULT    Immunizations given this encounter     Most Recent Immunizations   Administered Date(s) Administered     COVID-19,PF,Ashwin 03/10/2021     Flu, Unspecified 09/22/2016           Anticoagulation Information      Aspirin and Plavix      SIGNIFICANT IMAGING FINDINGS     Results for orders placed or performed during the hospital encounter of 08/06/21   CT Abdomen Pelvis w/o Contrast    Impression    IMPRESSION:   1.  No renal or ureteral calculi. No hydronephrosis or  hydroureter.  2.  Possible partial or mild small bowel obstruction. Adhesive disease is suspected. No pneumatosis or portal venous gas. A small amount of free fluid in the pelvis may be related. Note that paralytic ileus could have a similar appearance.  3.  Hepatic steatosis.      XR Gastrografin Sm Bowel Follow Through    Impression    IMPRESSION:  1.  Contrast material traverses the entire bowel by the 5 hour 15 minute time point.  2.  Distended small bowel loops are present.  3.  Findings could represent a partial small bowel obstruction.        SIGNIFICANT LABORATORY FINDINGS     Most Recent 3 CBC's:Recent Labs   Lab Test 08/10/21  0859 08/07/21  0649 08/06/21 1917   WBC 6.5 8.2 9.7   HGB 13.8 13.8 14.7   MCV 95 96 95    193 179     Most Recent 3 BMP's:Recent Labs   Lab Test 08/10/21  1217 08/10/21  0859 08/10/21  0644 08/07/21  0649 08/06/21  1917   NA  --  144  --  138 137   POTASSIUM  --  3.8  --  3.8 4.1   CHLORIDE  --  111*  --  106 106   CO2  --  25  --  25 21*   BUN  --  6*  --  10 11   CR  --  0.70  --  0.75 0.80   ANIONGAP  --  8  --  7 10   MODESTO  --  8.7  --  8.5 8.8   * 138* 145* 120 125     Most Recent 2 LFT's:Recent Labs   Lab Test 08/10/21  0859 08/07/21  0649   AST 28 23   ALT 24 25   ALKPHOS 55 59   BILITOTAL 0.5 1.2*       C. difficile positive on 8/8/2021    Discharge Orders        Reason for your hospital stay    Patient admitted with bowel obstruction and eventually has been diagnosed with C. difficile colitis.  Improved and tolerating oral intake.  Being discharged on oral vancomycin and has been followed by gastroenterology and surgery     Follow-up and recommended labs and tests     Follow up with primary care provider, Kristy Agustin, within 7 days to evaluate medication change and for hospital follow- up.  No follow up labs or test are needed.     Activity    Your activity upon discharge: activity as tolerated     Diet    Follow this diet upon discharge: Low residue        Examination   Physical Exam      Wt Readings from Last 1 Encounters:   08/06/21 114.8 kg (253 lb)       General Appearance: Alert, appears to be in no apparent distress  Respiratory: Lungs are clear throughout  Cardiovascular: Regular rate and rhythm without murmur  GI: Diffuse mild tenderness, soft  Skin: Clear  Other: Cranial nerves II through XII intact, appears to be cognitively intact, moves all 4 extremities well      Please see EMR for more detailed significant labs, imaging, consultant notes etc.    I, Gurdeep Harper MD, personally saw the patient today and spent greater than 30 minutes discharging this patient.    Gurdeep Harper MD  [facility]    CC:Kristy Agustin